# Patient Record
Sex: MALE | Race: BLACK OR AFRICAN AMERICAN | Employment: UNEMPLOYED | URBAN - METROPOLITAN AREA
[De-identification: names, ages, dates, MRNs, and addresses within clinical notes are randomized per-mention and may not be internally consistent; named-entity substitution may affect disease eponyms.]

---

## 2023-03-30 ENCOUNTER — HOSPITAL ENCOUNTER (EMERGENCY)
Age: 53
Discharge: HOME | End: 2023-03-30
Attending: EMERGENCY MEDICINE
Payer: COMMERCIAL

## 2023-03-30 ENCOUNTER — APPOINTMENT (OUTPATIENT)
Dept: GENERAL RADIOLOGY | Age: 53
End: 2023-03-30
Attending: EMERGENCY MEDICINE
Payer: COMMERCIAL

## 2023-03-30 VITALS
WEIGHT: 190 LBS | SYSTOLIC BLOOD PRESSURE: 151 MMHG | HEIGHT: 69 IN | TEMPERATURE: 98.4 F | RESPIRATION RATE: 18 BRPM | OXYGEN SATURATION: 98 % | DIASTOLIC BLOOD PRESSURE: 103 MMHG | BODY MASS INDEX: 28.14 KG/M2 | HEART RATE: 70 BPM

## 2023-03-30 DIAGNOSIS — R68.89 FLU-LIKE SYMPTOMS: Primary | ICD-10-CM

## 2023-03-30 LAB
FLUAV AG NPH QL IA: NEGATIVE
FLUBV AG NOSE QL IA: NEGATIVE
SARS-COV-2 RDRP RESP QL NAA+PROBE: NOT DETECTED

## 2023-03-30 PROCEDURE — 74011250636 HC RX REV CODE- 250/636: Performed by: EMERGENCY MEDICINE

## 2023-03-30 PROCEDURE — 87635 SARS-COV-2 COVID-19 AMP PRB: CPT

## 2023-03-30 PROCEDURE — 87804 INFLUENZA ASSAY W/OPTIC: CPT

## 2023-03-30 PROCEDURE — 71046 X-RAY EXAM CHEST 2 VIEWS: CPT

## 2023-03-30 PROCEDURE — 99284 EMERGENCY DEPT VISIT MOD MDM: CPT

## 2023-03-30 PROCEDURE — 93005 ELECTROCARDIOGRAM TRACING: CPT

## 2023-03-30 RX ORDER — KETOROLAC TROMETHAMINE 10 MG/1
10 TABLET, FILM COATED ORAL
Qty: 20 TABLET | Refills: 0 | Status: SHIPPED | OUTPATIENT
Start: 2023-03-30 | End: 2023-04-04

## 2023-03-30 RX ORDER — ONDANSETRON 4 MG/1
4 TABLET, ORALLY DISINTEGRATING ORAL
Qty: 20 TABLET | Refills: 0 | Status: SHIPPED | OUTPATIENT
Start: 2023-03-30

## 2023-03-30 RX ORDER — ONDANSETRON 4 MG/1
4 TABLET, ORALLY DISINTEGRATING ORAL
Status: COMPLETED | OUTPATIENT
Start: 2023-03-30 | End: 2023-03-30

## 2023-03-30 RX ADMIN — ONDANSETRON 4 MG: 4 TABLET, ORALLY DISINTEGRATING ORAL at 20:25

## 2023-03-30 NOTE — Clinical Note
[facility] EMERGENCY DEPT  [address] [place] [id number]  [phone]    Work/School Note    Date: 3/30/2023    To Whom It May concern:    Iva Sethi was seen and treated today in the emergency room by the following provider(s):  Attending Provider: Norman Verma MD.      Iva Sethi is excused from work/school on 3/30/2023 through 4/1/2023. He is medically clear to return to work/school on 4/2/2023.          Sincerely,          Andrew Mooney

## 2023-03-30 NOTE — Clinical Note
[facility] EMERGENCY DEPT  [address] [place] [id number]  [phone]    Work/School Note    Date: 3/30/2023    To Whom It May concern:    Mariela Farfan was seen and treated today in the emergency room by the following provider(s):  Attending Provider: Mat Haque MD.      Mariela Farfan is excused from work/school on 3/30/2023 through 4/1/2023. He is medically clear to return to work/school on 4/2/2023.          Sincerely,          Brian Collins MD

## 2023-03-31 LAB
ATRIAL RATE: 74 BPM
CALCULATED P AXIS, ECG09: 76 DEGREES
CALCULATED R AXIS, ECG10: 52 DEGREES
CALCULATED T AXIS, ECG11: 48 DEGREES
DIAGNOSIS, 93000: NORMAL
P-R INTERVAL, ECG05: 140 MS
Q-T INTERVAL, ECG07: 414 MS
QRS DURATION, ECG06: 88 MS
QTC CALCULATION (BEZET), ECG08: 459 MS
VENTRICULAR RATE, ECG03: 74 BPM

## 2023-03-31 NOTE — ED PROVIDER NOTES
EMERGENCY DEPARTMENT HISTORY AND PHYSICAL EXAM      Date: 3/30/2023  Patient Name: Felipe Burrell    History of Presenting Illness     Chief Complaint   Patient presents with    Headache    Nausea       History Provided By: Patient    HPI: Felipe Burrell, 47 y.o. male presents to the ED with CC of 3 days history of fever, cough, headache and nausea. Patient states the symptoms started feeling better but work wanted to get him evaluated. Patient denies SOB, chest pain, or any neurological symptoms. There are no other complaints, changes, or physical findings at this time. Past History     Past Medical History:  Past Medical History:   Diagnosis Date    Arrhythmia     HIV (human immunodeficiency virus infection) ([facility] [address]        Allergies: Allergies   Allergen Reactions    Keppra [Levetiracetam] Unknown (comments)       Review of Systems   Vital signs and nursing notes reviewed  Review of Systems  Negative except as per HPI. Physical Exam   Visit Vitals  BP (!) 151/103 (BP 1 Location: Right upper arm, BP Patient Position: At rest)   Pulse 70   Temp 98.4 °F (36.9 °C)   Resp 18   Ht 5' 9\" (1.753 m)   Wt 86.2 kg (190 lb)   SpO2 98%   BMI 28.06 kg/m²     CONSTITUTIONAL: Alert, in no distress. Appears stated age. HEAD:  Normocephalic, atraumatic  EYES: EOM intact. No conjunctival injection or scleral icterus  Neck:  Supple. No meningismus  RESP: Normal with no work of breathing, speaking in full sentences. CTA B.  CV: Well perfused. NEURO: Alert with normal mentation, moving extremities spontaneously  PSYCH: Normal mood, normal affect      Medical Decision Making   Patient presents for flu-like symptoms with normal oxygen saturation, benign physical exam and mild URI symptoms or exposure. Influenza testing was conducted. The patient was given supportive care recommendations and agrees with the plan to be discharged home. Tamiflu was not prescribed.   They were provided instructions to return for difficulty breathing, chest pain, altered mentation, or any other new or worsening symptoms. ED Course:   Initial assessment performed. The patients presenting problems have been discussed, and they are in agreement with the care plan formulated and outlined with them. I have encouraged them to ask questions as they arise throughout their visit. Critical Care Time: None    Disposition:  DISCHARGE NOTE:  The pt is ready for discharge. The pt's signs, symptoms, diagnosis, and discharge instructions have been discussed and pt has conveyed their understanding. The pt is to follow up as recommended or return to ER should their symptoms worsen. Plan has been discussed and pt is in agreement. PLAN:  1. Current Discharge Medication List        START taking these medications    Details   ondansetron (ZOFRAN ODT) 4 mg disintegrating tablet Take 1 Tablet by mouth every eight (8) hours as needed for Nausea or Vomiting. Qty: 20 Tablet, Refills: 0  Start date: 3/30/2023      ketorolac (TORADOL) 10 mg tablet Take 1 Tablet by mouth every six (6) hours as needed for Pain for up to 5 days. Qty: 20 Tablet, Refills: 0  Start date: 3/30/2023, End date: 4/4/2023           2. Follow-up Information       Follow up With Specialties Details Why Contact Info    Lavinia Ozuna [place] Physician Assistant  As needed [address] [place] [address] [place] [address] [phone]            3. Flu Testing results, if performed, will be called if positive. Patient should utilize [employer] to access results. 4. Take Tylenol or Ibuprofen as needed  5. Drink plenty of fluids  6. Return to ED if worse especially if any shortness of breath, chest pain or altered mentation. Diagnosis     Clinical Impression:   1. Flu-like symptoms        Please note that this dictation was completed with [employer], the [employer] voice recognition software.  Quite often unanticipated grammatical, syntax, homophones, and other interpretive errors are inadvertently transcribed by the computer software. Please disregards these errors. Please excuse any errors that have escaped final proofreading.

## 2023-03-31 NOTE — DISCHARGE INSTRUCTIONS
Thank you! Thank you for allowing me to care for you in the emergency department. It is my goal to provide you with excellent care. If you have not received excellent quality care, please ask to speak to the nurse manager. Please fill out the survey that will come to you by mail or email since we listen to your feedback! Below you will find a list of your tests from today's visit. Should you have any questions, please do not hesitate to call the emergency department. Labs  Recent Results (from the past 12 hour(s))   INFLUENZA A & B AG (RAPID TEST)    Collection Time: 03/30/23  8:27 PM   Result Value Ref Range    Influenza A Antigen Negative Negative      Influenza B Antigen Negative Negative     COVID-19 RAPID TEST    Collection Time: 03/30/23  8:27 PM   Result Value Ref Range    COVID-19 rapid test Not Detected Not Detected         Radiologic Studies  XR CHEST PA LAT   Final Result   No acute cardiopulmonary disease. CT Results  (Last 48 hours)      None          CXR Results  (Last 48 hours)                 03/30/23 2037  XR CHEST PA LAT Final result    Impression:  No acute cardiopulmonary disease. Narrative: Indication:  Cough x3 days. Exam: PA and lateral views of the chest.       Direct comparison is made to prior CXR dated November 2022. Findings: Cardiomediastinal silhouette is within normal limits. Lungs are clear   bilaterally. Pleural spaces are normal. Osseous structures are intact.                 ------------------------------------------------------------------------------------------------------------  The exam and treatment you received in the Emergency Department were for an urgent problem and are not intended as complete care. It is important that you follow-up with a doctor, nurse practitioner, or physician assistant to:  (1) confirm your diagnosis,  (2) re-evaluation of changes in your illness and treatment, and  (3) for ongoing care.  Please take your discharge instructions with you when you go to your follow-up appointment. If you have any problem arranging a follow-up appointment, contact the Emergency Department. If your symptoms become worse or you do not improve as expected and you are unable to reach your health care provider, please return to the Emergency Department. We are available 24 hours a day. If a prescription has been provided, please have it filled as soon as possible to prevent a delay in treatment. If you have any questions or reservations about taking the medication due to side effects or interactions with other medications, please call your primary care provider or contact the ER.

## 2023-05-18 ENCOUNTER — HOSPITAL ENCOUNTER (EMERGENCY)
Facility: HOSPITAL | Age: 53
Discharge: HOME | End: 2023-05-18
Attending: EMERGENCY MEDICINE
Payer: COMMERCIAL

## 2023-05-18 VITALS
WEIGHT: 190 LBS | HEART RATE: 86 BPM | OXYGEN SATURATION: 97 % | BODY MASS INDEX: 28.14 KG/M2 | TEMPERATURE: 100.7 F | DIASTOLIC BLOOD PRESSURE: 67 MMHG | RESPIRATION RATE: 15 BRPM | SYSTOLIC BLOOD PRESSURE: 128 MMHG | HEIGHT: 69 IN

## 2023-05-18 DIAGNOSIS — B34.9 ACUTE VIRAL SYNDROME: Primary | ICD-10-CM

## 2023-05-18 PROCEDURE — 99283 EMERGENCY DEPT VISIT LOW MDM: CPT

## 2023-05-18 RX ORDER — NAPROXEN 500 MG/1
500 TABLET ORAL 2 TIMES DAILY
Qty: 60 TABLET | Refills: 0 | Status: SHIPPED | OUTPATIENT
Start: 2023-05-18

## 2023-05-18 RX ORDER — FLUTICASONE PROPIONATE 220 UG/1
2 AEROSOL, METERED RESPIRATORY (INHALATION)
COMMUNITY
Start: 2023-05-04 | End: 2024-05-03

## 2023-05-18 RX ORDER — AMLODIPINE BESYLATE 5 MG/1
5 TABLET ORAL DAILY
COMMUNITY
Start: 2023-05-15

## 2023-05-18 RX ORDER — OMEPRAZOLE 20 MG/1
20 CAPSULE, DELAYED RELEASE ORAL DAILY
Qty: 30 CAPSULE | Refills: 11 | COMMUNITY
Start: 2023-03-02 | End: 2024-03-01

## 2023-05-18 RX ORDER — LISINOPRIL 10 MG/1
10 TABLET ORAL DAILY
COMMUNITY
Start: 2022-12-20

## 2023-05-18 RX ORDER — BICTEGRAVIR SODIUM, EMTRICITABINE, AND TENOFOVIR ALAFENAMIDE FUMARATE 50; 200; 25 MG/1; MG/1; MG/1
1 TABLET ORAL DAILY
COMMUNITY
Start: 2023-03-30

## 2023-05-18 ASSESSMENT — LIFESTYLE VARIABLES
HOW MANY STANDARD DRINKS CONTAINING ALCOHOL DO YOU HAVE ON A TYPICAL DAY: PATIENT DOES NOT DRINK
HOW OFTEN DO YOU HAVE A DRINK CONTAINING ALCOHOL: NEVER

## 2023-05-18 ASSESSMENT — PAIN DESCRIPTION - LOCATION: LOCATION: HEAD

## 2023-05-18 ASSESSMENT — PAIN - FUNCTIONAL ASSESSMENT: PAIN_FUNCTIONAL_ASSESSMENT: 0-10

## 2023-05-18 ASSESSMENT — PAIN SCALES - GENERAL: PAINLEVEL_OUTOF10: 6

## 2023-05-18 NOTE — ED PROVIDER NOTES
EMERGENCY DEPARTMENT HISTORY AND PHYSICAL EXAM      Date: 5/18/2023  Patient Name: Amy Meier    History of Presenting Illness     Chief Complaint   Patient presents with    Chills       History Provided By: Patient    HPI: Amy Meier, 47 y.o. male presents to the ED with CC of with a past medical history significant for HIV controlled with Kin Yarile presents with fever chills and a swollen lymph node in his groin. He also describes body aches and fever. He is also complaining of mild headache. He specifically denies any chest pain, shortness of breath, rash, diarrhea, headache or night sweats. Symptoms are mild at this point time but progressive getting worse as the day goes on. .       Patient denies SOB, chest pain, or any neurological symptoms. There are no other complaints, changes, or physical findings at this time. Past History     Past Medical History:  Past Medical History:   Diagnosis Date    Arrhythmia     HIV (human immunodeficiency virus infection) ([facility] [address]        Allergies: Allergies   Allergen Reactions    Levetiracetam      Other reaction(s): Unknown (comments)       Review of Systems   Vital signs and nursing notes reviewed  Review of Systems   Constitutional:  Positive for chills and fever. Neurological:  Positive for headaches. All other systems reviewed and are negative. Physical Exam     Vitals:    05/18/23 1822   BP: 128/67   Pulse: 86   Resp: 15   Temp: (!) 100.7 °F (38.2 °C)   SpO2: 97%     CONSTITUTIONAL: Alert, in no distress. Appears stated age. HEAD:  Normocephalic, atraumatic  EYES: EOM intact. No conjunctival injection or scleral icterus  Neck:  Supple. No meningismus  RESP: Normal with no work of breathing, speaking in full sentences. CV: Well perfused.    NEURO: Alert with normal mentation, moving extremities spontaneously  PSYCH: Normal mood, normal affect  Abdomen: Soft nontender nondistended    Medical Decision Making   Patient presents for COVID 19 testing with

## 2023-05-19 ENCOUNTER — APPOINTMENT (OUTPATIENT)
Facility: HOSPITAL | Age: 53
End: 2023-05-19
Payer: COMMERCIAL

## 2023-05-19 ENCOUNTER — HOSPITAL ENCOUNTER (EMERGENCY)
Facility: HOSPITAL | Age: 53
Discharge: HOME | End: 2023-05-19
Payer: COMMERCIAL

## 2023-05-19 VITALS
BODY MASS INDEX: 28.14 KG/M2 | SYSTOLIC BLOOD PRESSURE: 150 MMHG | HEART RATE: 51 BPM | WEIGHT: 190 LBS | TEMPERATURE: 99.4 F | HEIGHT: 69 IN | DIASTOLIC BLOOD PRESSURE: 78 MMHG | RESPIRATION RATE: 20 BRPM | OXYGEN SATURATION: 99 %

## 2023-05-19 DIAGNOSIS — L03.115 CELLULITIS OF RIGHT LOWER EXTREMITY: Primary | ICD-10-CM

## 2023-05-19 DIAGNOSIS — R59.9 REACTIVE LYMPHADENOPATHY: ICD-10-CM

## 2023-05-19 LAB
DEPRECATED S PYO AG THROAT QL EIA: NEGATIVE
FLUAV AG NPH QL IA: NEGATIVE
FLUBV AG NOSE QL IA: NEGATIVE
SARS-COV-2 RDRP RESP QL NAA+PROBE: NOT DETECTED

## 2023-05-19 PROCEDURE — 6370000000 HC RX 637 (ALT 250 FOR IP): Performed by: STUDENT IN AN ORGANIZED HEALTH CARE EDUCATION/TRAINING PROGRAM

## 2023-05-19 PROCEDURE — 87070 CULTURE OTHR SPECIMN AEROBIC: CPT

## 2023-05-19 PROCEDURE — 71046 X-RAY EXAM CHEST 2 VIEWS: CPT

## 2023-05-19 PROCEDURE — 99284 EMERGENCY DEPT VISIT MOD MDM: CPT

## 2023-05-19 PROCEDURE — 87635 SARS-COV-2 COVID-19 AMP PRB: CPT

## 2023-05-19 PROCEDURE — 87804 INFLUENZA ASSAY W/OPTIC: CPT

## 2023-05-19 PROCEDURE — 87880 STREP A ASSAY W/OPTIC: CPT

## 2023-05-19 RX ORDER — IBUPROFEN 400 MG/1
400 TABLET ORAL EVERY 6 HOURS PRN
Qty: 20 TABLET | Refills: 0 | Status: SHIPPED | OUTPATIENT
Start: 2023-05-19 | End: 2023-05-19 | Stop reason: SDUPTHER

## 2023-05-19 RX ORDER — ACETAMINOPHEN 500 MG
1000 TABLET ORAL
Status: COMPLETED | OUTPATIENT
Start: 2023-05-19 | End: 2023-05-19

## 2023-05-19 RX ORDER — SULFAMETHOXAZOLE AND TRIMETHOPRIM 800; 160 MG/1; MG/1
1 TABLET ORAL 2 TIMES DAILY
Qty: 20 TABLET | Refills: 0 | Status: SHIPPED | OUTPATIENT
Start: 2023-05-19 | End: 2023-05-29

## 2023-05-19 RX ORDER — ACETAMINOPHEN 500 MG
500 TABLET ORAL 4 TIMES DAILY PRN
Qty: 100 TABLET | Refills: 0 | Status: SHIPPED | OUTPATIENT
Start: 2023-05-19

## 2023-05-19 RX ORDER — IBUPROFEN 400 MG/1
400 TABLET ORAL EVERY 6 HOURS PRN
Qty: 20 TABLET | Refills: 0 | Status: SHIPPED | OUTPATIENT
Start: 2023-05-19

## 2023-05-19 RX ORDER — CEPHALEXIN 500 MG/1
500 CAPSULE ORAL 4 TIMES DAILY
Qty: 40 CAPSULE | Refills: 0 | Status: SHIPPED | OUTPATIENT
Start: 2023-05-19 | End: 2023-05-19 | Stop reason: SDUPTHER

## 2023-05-19 RX ORDER — TRAMADOL HYDROCHLORIDE 50 MG/1
50 TABLET ORAL EVERY 6 HOURS PRN
Qty: 12 TABLET | Refills: 0 | Status: SHIPPED | OUTPATIENT
Start: 2023-05-19 | End: 2023-05-19 | Stop reason: SDUPTHER

## 2023-05-19 RX ORDER — SULFAMETHOXAZOLE AND TRIMETHOPRIM 800; 160 MG/1; MG/1
1 TABLET ORAL 2 TIMES DAILY
Qty: 20 TABLET | Refills: 0 | Status: SHIPPED | OUTPATIENT
Start: 2023-05-19 | End: 2023-05-19 | Stop reason: SDUPTHER

## 2023-05-19 RX ORDER — IBUPROFEN 600 MG/1
600 TABLET ORAL
Status: COMPLETED | OUTPATIENT
Start: 2023-05-19 | End: 2023-05-19

## 2023-05-19 RX ORDER — ACETAMINOPHEN 500 MG
500 TABLET ORAL 4 TIMES DAILY PRN
Qty: 100 TABLET | Refills: 0 | Status: SHIPPED | OUTPATIENT
Start: 2023-05-19 | End: 2023-05-19 | Stop reason: SDUPTHER

## 2023-05-19 RX ORDER — CEPHALEXIN 500 MG/1
500 CAPSULE ORAL 4 TIMES DAILY
Qty: 40 CAPSULE | Refills: 0 | Status: SHIPPED | OUTPATIENT
Start: 2023-05-19 | End: 2023-05-29

## 2023-05-19 RX ORDER — TRAMADOL HYDROCHLORIDE 50 MG/1
50 TABLET ORAL EVERY 6 HOURS PRN
Qty: 12 TABLET | Refills: 0 | Status: SHIPPED | OUTPATIENT
Start: 2023-05-19 | End: 2023-05-22

## 2023-05-19 RX ADMIN — ACETAMINOPHEN 1000 MG: 500 TABLET ORAL at 13:40

## 2023-05-19 RX ADMIN — IBUPROFEN 600 MG: 600 TABLET, FILM COATED ORAL at 13:40

## 2023-05-19 ASSESSMENT — PAIN SCALES - GENERAL: PAINLEVEL_OUTOF10: 10

## 2023-05-19 ASSESSMENT — PAIN SCALES - WONG BAKER: WONGBAKER_NUMERICALRESPONSE: 0

## 2023-05-19 NOTE — ED TRIAGE NOTES
Boils under arms, body aches, chills, states \"I cant breathe\" fever here 102.4, hx of hiv, compliant w/ meds \"undetectable\"

## 2023-05-19 NOTE — ED PROVIDER NOTES
MEDICATIONS:  Current Discharge Medication List          I am the Primary Clinician of Record: ÁNGEL Johnson NP (electronically signed)    (Please note that parts of this dictation were completed with voice recognition software. Quite often unanticipated grammatical, syntax, homophones, and other interpretive errors are inadvertently transcribed by the computer software. Please disregards these errors.  Please excuse any errors that have escaped final proofreading.)

## 2023-05-19 NOTE — ED NOTES
Pt understanding of dc instructions medications and followup care, alert oriented and ambulatory at discharge.       Lina Newman RN  05/19/23 2643

## 2023-05-21 LAB
BACTERIA SPEC CULT: NORMAL
Lab: NORMAL

## 2023-06-29 ENCOUNTER — HOSPITAL ENCOUNTER (INPATIENT)
Facility: HOSPITAL | Age: 53
LOS: 6 days | Discharge: HOME | DRG: 751 | End: 2023-07-06
Attending: EMERGENCY MEDICINE | Admitting: PSYCHIATRY & NEUROLOGY
Payer: COMMERCIAL

## 2023-06-29 ENCOUNTER — APPOINTMENT (OUTPATIENT)
Facility: HOSPITAL | Age: 53
DRG: 751 | End: 2023-06-29
Payer: COMMERCIAL

## 2023-06-29 DIAGNOSIS — R07.89 ATYPICAL CHEST PAIN: Primary | ICD-10-CM

## 2023-06-29 DIAGNOSIS — F32.A DEPRESSION, UNSPECIFIED DEPRESSION TYPE: ICD-10-CM

## 2023-06-29 LAB
ALBUMIN SERPL-MCNC: 3.4 G/DL (ref 3.5–5)
ALBUMIN/GLOB SERPL: 1.2 (ref 1.1–2.2)
ALP SERPL-CCNC: 68 U/L (ref 45–117)
ALT SERPL-CCNC: 25 U/L (ref 12–78)
AMPHET UR QL SCN: NEGATIVE
ANION GAP SERPL CALC-SCNC: 6 MMOL/L (ref 5–15)
APTT PPP: 24.6 SEC (ref 21.2–34.1)
AST SERPL W P-5'-P-CCNC: 17 U/L (ref 15–37)
BARBITURATES UR QL SCN: NEGATIVE
BASOPHILS # BLD: 0 K/UL (ref 0–0.1)
BASOPHILS NFR BLD: 1 % (ref 0–1)
BENZODIAZ UR QL: NEGATIVE
BILIRUB SERPL-MCNC: 0.6 MG/DL (ref 0.2–1)
BNP SERPL-MCNC: 220 PG/ML
BUN SERPL-MCNC: 17 MG/DL (ref 6–20)
BUN/CREAT SERPL: 13 (ref 12–20)
CA-I BLD-MCNC: 8.8 MG/DL (ref 8.5–10.1)
CANNABINOIDS UR QL SCN: NEGATIVE
CHLORIDE SERPL-SCNC: 109 MMOL/L (ref 97–108)
CO2 SERPL-SCNC: 26 MMOL/L (ref 21–32)
COCAINE UR QL SCN: POSITIVE
CREAT SERPL-MCNC: 1.32 MG/DL (ref 0.7–1.3)
DIFFERENTIAL METHOD BLD: ABNORMAL
EOSINOPHIL # BLD: 0 K/UL (ref 0–0.4)
EOSINOPHIL NFR BLD: 1 % (ref 0–7)
ERYTHROCYTE [DISTWIDTH] IN BLOOD BY AUTOMATED COUNT: 12.8 % (ref 11.5–14.5)
ETHANOL SERPL-MCNC: <10 MG/DL (ref 0–0.08)
GLOBULIN SER CALC-MCNC: 2.8 G/DL (ref 2–4)
GLUCOSE SERPL-MCNC: 180 MG/DL (ref 65–100)
HCT VFR BLD AUTO: 38.1 % (ref 36.6–50.3)
HGB BLD-MCNC: 12.5 G/DL (ref 12.1–17)
IMM GRANULOCYTES # BLD AUTO: 0 K/UL (ref 0–0.04)
IMM GRANULOCYTES NFR BLD AUTO: 0 % (ref 0–0.5)
INR PPP: 1.1 (ref 0.9–1.1)
LIPASE SERPL-CCNC: 95 U/L (ref 73–393)
LYMPHOCYTES # BLD: 1.9 K/UL (ref 0.8–3.5)
LYMPHOCYTES NFR BLD: 53 % (ref 12–49)
Lab: ABNORMAL
MAGNESIUM SERPL-MCNC: 1.9 MG/DL (ref 1.6–2.4)
MCH RBC QN AUTO: 30 PG (ref 26–34)
MCHC RBC AUTO-ENTMCNC: 32.8 G/DL (ref 30–36.5)
MCV RBC AUTO: 91.4 FL (ref 80–99)
METHADONE UR QL: NEGATIVE
MONOCYTES # BLD: 0.3 K/UL (ref 0–1)
MONOCYTES NFR BLD: 9 % (ref 5–13)
NEUTS SEG # BLD: 1.3 K/UL (ref 1.8–8)
NEUTS SEG NFR BLD: 36 % (ref 32–75)
NRBC # BLD: 0 K/UL (ref 0–0.01)
NRBC BLD-RTO: 0 PER 100 WBC
OPIATES UR QL: NEGATIVE
PCP UR QL: NEGATIVE
PLATELET # BLD AUTO: 172 K/UL (ref 150–400)
PMV BLD AUTO: 10.5 FL (ref 8.9–12.9)
POTASSIUM SERPL-SCNC: 4.1 MMOL/L (ref 3.5–5.1)
PROT SERPL-MCNC: 6.2 G/DL (ref 6.4–8.2)
PROTHROMBIN TIME: 14.7 SEC (ref 11.9–14.6)
RBC # BLD AUTO: 4.17 M/UL (ref 4.1–5.7)
SODIUM SERPL-SCNC: 141 MMOL/L (ref 136–145)
THERAPEUTIC RANGE: NORMAL SEC
TROPONIN I SERPL HS-MCNC: 11 NG/L (ref 0–76)
TROPONIN I SERPL HS-MCNC: 12 NG/L (ref 0–76)
WBC # BLD AUTO: 3.6 K/UL (ref 4.1–11.1)

## 2023-06-29 PROCEDURE — 83880 ASSAY OF NATRIURETIC PEPTIDE: CPT

## 2023-06-29 PROCEDURE — 71045 X-RAY EXAM CHEST 1 VIEW: CPT

## 2023-06-29 PROCEDURE — 6370000000 HC RX 637 (ALT 250 FOR IP): Performed by: EMERGENCY MEDICINE

## 2023-06-29 PROCEDURE — 83690 ASSAY OF LIPASE: CPT

## 2023-06-29 PROCEDURE — 84484 ASSAY OF TROPONIN QUANT: CPT

## 2023-06-29 PROCEDURE — 81001 URINALYSIS AUTO W/SCOPE: CPT

## 2023-06-29 PROCEDURE — 83735 ASSAY OF MAGNESIUM: CPT

## 2023-06-29 PROCEDURE — 80307 DRUG TEST PRSMV CHEM ANLYZR: CPT

## 2023-06-29 PROCEDURE — 36415 COLL VENOUS BLD VENIPUNCTURE: CPT

## 2023-06-29 PROCEDURE — 85730 THROMBOPLASTIN TIME PARTIAL: CPT

## 2023-06-29 PROCEDURE — 87641 MR-STAPH DNA AMP PROBE: CPT

## 2023-06-29 PROCEDURE — 85610 PROTHROMBIN TIME: CPT

## 2023-06-29 PROCEDURE — 87636 SARSCOV2 & INF A&B AMP PRB: CPT

## 2023-06-29 PROCEDURE — 80053 COMPREHEN METABOLIC PANEL: CPT

## 2023-06-29 PROCEDURE — 99285 EMERGENCY DEPT VISIT HI MDM: CPT

## 2023-06-29 PROCEDURE — 85025 COMPLETE CBC W/AUTO DIFF WBC: CPT

## 2023-06-29 PROCEDURE — 82077 ASSAY SPEC XCP UR&BREATH IA: CPT

## 2023-06-29 PROCEDURE — 93005 ELECTROCARDIOGRAM TRACING: CPT | Performed by: EMERGENCY MEDICINE

## 2023-06-29 RX ORDER — ASPIRIN 81 MG/1
324 TABLET, CHEWABLE ORAL
Status: COMPLETED | OUTPATIENT
Start: 2023-06-29 | End: 2023-06-29

## 2023-06-29 RX ADMIN — ASPIRIN 324 MG: 81 TABLET, CHEWABLE ORAL at 15:19

## 2023-06-29 ASSESSMENT — SLEEP AND FATIGUE QUESTIONNAIRES
DO YOU USE A SLEEP AID: NO
SLEEP PATTERN: INSOMNIA
AVERAGE NUMBER OF SLEEP HOURS: 2
DO YOU HAVE DIFFICULTY SLEEPING: YES

## 2023-06-29 ASSESSMENT — LIFESTYLE VARIABLES
HOW OFTEN DO YOU HAVE A DRINK CONTAINING ALCOHOL: NEVER
HOW MANY STANDARD DRINKS CONTAINING ALCOHOL DO YOU HAVE ON A TYPICAL DAY: PATIENT DOES NOT DRINK

## 2023-06-30 PROBLEM — F32.A DEPRESSION: Status: ACTIVE | Noted: 2023-06-30

## 2023-06-30 PROBLEM — F39 UNSPECIFIED MOOD (AFFECTIVE) DISORDER (HCC): Status: ACTIVE | Noted: 2023-06-30

## 2023-06-30 LAB
APPEARANCE UR: CLEAR
BACTERIA URNS QL MICRO: NEGATIVE /HPF
BILIRUB UR QL: NEGATIVE
COLOR UR: NORMAL
EKG ATRIAL RATE: 86 BPM
EKG DIAGNOSIS: NORMAL
EKG P AXIS: 75 DEGREES
EKG P-R INTERVAL: 126 MS
EKG Q-T INTERVAL: 370 MS
EKG QRS DURATION: 86 MS
EKG QTC CALCULATION (BAZETT): 442 MS
EKG R AXIS: 68 DEGREES
EKG T AXIS: 71 DEGREES
EKG VENTRICULAR RATE: 86 BPM
EPITH CASTS URNS QL MICRO: NORMAL /LPF
FLUAV RNA SPEC QL NAA+PROBE: NOT DETECTED
FLUBV RNA SPEC QL NAA+PROBE: NOT DETECTED
GLUCOSE UR STRIP.AUTO-MCNC: NEGATIVE MG/DL
HGB UR QL STRIP: NEGATIVE
KETONES UR QL STRIP.AUTO: NEGATIVE MG/DL
LEUKOCYTE ESTERASE UR QL STRIP.AUTO: NEGATIVE
MRSA DNA SPEC QL NAA+PROBE: DETECTED
MUCOUS THREADS URNS QL MICRO: NORMAL /LPF
NITRITE UR QL STRIP.AUTO: NEGATIVE
PH UR STRIP: 5 (ref 5–8)
PROT UR STRIP-MCNC: NEGATIVE MG/DL
RBC #/AREA URNS HPF: NORMAL /HPF (ref 0–5)
SARS-COV-2 RNA RESP QL NAA+PROBE: NOT DETECTED
SP GR UR REFRACTOMETRY: 1.03 (ref 1–1.03)
URINE CULTURE IF INDICATED: NORMAL
UROBILINOGEN UR QL STRIP.AUTO: 0.1 EU/DL (ref 0.1–1)
WBC URNS QL MICRO: NORMAL /HPF (ref 0–4)

## 2023-06-30 PROCEDURE — 6370000000 HC RX 637 (ALT 250 FOR IP): Performed by: PSYCHIATRY & NEUROLOGY

## 2023-06-30 PROCEDURE — 1240000000 HC EMOTIONAL WELLNESS R&B

## 2023-06-30 RX ORDER — AMLODIPINE BESYLATE 5 MG/1
5 TABLET ORAL DAILY
Status: DISCONTINUED | OUTPATIENT
Start: 2023-06-30 | End: 2023-07-06 | Stop reason: HOSPADM

## 2023-06-30 RX ORDER — POLYETHYLENE GLYCOL 3350 17 G/17G
17 POWDER, FOR SOLUTION ORAL DAILY PRN
Status: DISCONTINUED | OUTPATIENT
Start: 2023-06-30 | End: 2023-07-06 | Stop reason: HOSPADM

## 2023-06-30 RX ORDER — ACETAMINOPHEN 325 MG/1
650 TABLET ORAL EVERY 4 HOURS PRN
Status: DISCONTINUED | OUTPATIENT
Start: 2023-06-30 | End: 2023-07-06 | Stop reason: HOSPADM

## 2023-06-30 RX ORDER — NICOTINE 21 MG/24HR
1 PATCH, TRANSDERMAL 24 HOURS TRANSDERMAL DAILY
Status: DISCONTINUED | OUTPATIENT
Start: 2023-06-30 | End: 2023-07-06 | Stop reason: HOSPADM

## 2023-06-30 RX ORDER — LISINOPRIL 10 MG/1
10 TABLET ORAL DAILY
Status: DISCONTINUED | OUTPATIENT
Start: 2023-06-30 | End: 2023-07-06 | Stop reason: HOSPADM

## 2023-06-30 RX ORDER — FLUTICASONE PROPIONATE 220 UG/1
2 AEROSOL, METERED RESPIRATORY (INHALATION) 2 TIMES DAILY PRN
Status: DISCONTINUED | OUTPATIENT
Start: 2023-06-30 | End: 2023-07-06 | Stop reason: HOSPADM

## 2023-06-30 RX ORDER — NAPROXEN 500 MG/1
500 TABLET ORAL 2 TIMES DAILY
Status: DISCONTINUED | OUTPATIENT
Start: 2023-06-30 | End: 2023-07-03

## 2023-06-30 RX ORDER — HYDROXYZINE 50 MG/1
50 TABLET, FILM COATED ORAL 3 TIMES DAILY PRN
Status: DISCONTINUED | OUTPATIENT
Start: 2023-06-30 | End: 2023-07-06 | Stop reason: HOSPADM

## 2023-06-30 RX ORDER — TRAZODONE HYDROCHLORIDE 50 MG/1
50 TABLET ORAL NIGHTLY PRN
Status: DISCONTINUED | OUTPATIENT
Start: 2023-06-30 | End: 2023-07-06 | Stop reason: HOSPADM

## 2023-06-30 RX ORDER — ESCITALOPRAM OXALATE 10 MG/1
5 TABLET ORAL DAILY
Status: DISCONTINUED | OUTPATIENT
Start: 2023-07-01 | End: 2023-07-06 | Stop reason: HOSPADM

## 2023-06-30 RX ORDER — ALBUTEROL SULFATE 90 UG/1
2 AEROSOL, METERED RESPIRATORY (INHALATION) EVERY 6 HOURS PRN
Status: DISCONTINUED | OUTPATIENT
Start: 2023-06-30 | End: 2023-07-06 | Stop reason: HOSPADM

## 2023-06-30 RX ORDER — CETIRIZINE HYDROCHLORIDE 10 MG/1
10 TABLET ORAL DAILY PRN
Status: DISCONTINUED | OUTPATIENT
Start: 2023-06-30 | End: 2023-07-06 | Stop reason: HOSPADM

## 2023-06-30 RX ORDER — MAGNESIUM HYDROXIDE/ALUMINUM HYDROXICE/SIMETHICONE 120; 1200; 1200 MG/30ML; MG/30ML; MG/30ML
30 SUSPENSION ORAL EVERY 6 HOURS PRN
Status: DISCONTINUED | OUTPATIENT
Start: 2023-06-30 | End: 2023-07-06 | Stop reason: HOSPADM

## 2023-06-30 RX ADMIN — METOPROLOL TARTRATE 25 MG: 25 TABLET, FILM COATED ORAL at 08:49

## 2023-06-30 RX ADMIN — NAPROXEN 500 MG: 500 TABLET ORAL at 08:49

## 2023-06-30 RX ADMIN — TRAZODONE HYDROCHLORIDE 50 MG: 50 TABLET ORAL at 21:54

## 2023-06-30 RX ADMIN — HYDROXYZINE HYDROCHLORIDE 50 MG: 50 TABLET, FILM COATED ORAL at 21:53

## 2023-06-30 RX ADMIN — AMLODIPINE BESYLATE 5 MG: 5 TABLET ORAL at 08:49

## 2023-06-30 RX ADMIN — NAPROXEN 500 MG: 500 TABLET ORAL at 21:53

## 2023-06-30 RX ADMIN — LISINOPRIL 10 MG: 10 TABLET ORAL at 08:49

## 2023-06-30 RX ADMIN — BICTEGRAVIR SODIUM, EMTRICITABINE, AND TENOFOVIR ALAFENAMIDE FUMARATE 1 TABLET: 50; 200; 25 TABLET ORAL at 09:24

## 2023-06-30 ASSESSMENT — PAIN SCALES - GENERAL
PAINLEVEL_OUTOF10: 0
PAINLEVEL_OUTOF10: 4
PAINLEVEL_OUTOF10: 0

## 2023-06-30 ASSESSMENT — PAIN DESCRIPTION - LOCATION: LOCATION: GENERALIZED

## 2023-06-30 ASSESSMENT — PAIN DESCRIPTION - DESCRIPTORS: DESCRIPTORS: ACHING

## 2023-06-30 ASSESSMENT — ENCOUNTER SYMPTOMS
SINUS PRESSURE: 1
RESPIRATORY NEGATIVE: 1
GASTROINTESTINAL NEGATIVE: 1

## 2023-07-01 PROCEDURE — 6370000000 HC RX 637 (ALT 250 FOR IP): Performed by: PSYCHIATRY & NEUROLOGY

## 2023-07-01 PROCEDURE — 1100000000 HC RM PRIVATE

## 2023-07-01 PROCEDURE — 6370000000 HC RX 637 (ALT 250 FOR IP): Performed by: PHYSICIAN ASSISTANT

## 2023-07-01 RX ADMIN — ESCITALOPRAM OXALATE 5 MG: 10 TABLET ORAL at 08:36

## 2023-07-01 RX ADMIN — NAPROXEN 500 MG: 500 TABLET ORAL at 22:27

## 2023-07-01 RX ADMIN — TRAZODONE HYDROCHLORIDE 50 MG: 50 TABLET ORAL at 22:27

## 2023-07-01 RX ADMIN — NAPROXEN 500 MG: 500 TABLET ORAL at 08:36

## 2023-07-01 RX ADMIN — BICTEGRAVIR SODIUM, EMTRICITABINE, AND TENOFOVIR ALAFENAMIDE FUMARATE 1 TABLET: 50; 200; 25 TABLET ORAL at 08:36

## 2023-07-01 RX ADMIN — MUPIROCIN: 20 OINTMENT TOPICAL at 22:27

## 2023-07-01 RX ADMIN — MUPIROCIN: 20 OINTMENT TOPICAL at 13:21

## 2023-07-01 ASSESSMENT — PAIN SCALES - GENERAL: PAINLEVEL_OUTOF10: 6

## 2023-07-01 ASSESSMENT — PAIN DESCRIPTION - LOCATION: LOCATION: ARM;BACK;SHOULDER

## 2023-07-01 ASSESSMENT — PAIN DESCRIPTION - PAIN TYPE: TYPE: CHRONIC PAIN

## 2023-07-02 PROCEDURE — 6370000000 HC RX 637 (ALT 250 FOR IP): Performed by: PSYCHIATRY & NEUROLOGY

## 2023-07-02 PROCEDURE — 1100000000 HC RM PRIVATE

## 2023-07-02 RX ORDER — ATENOLOL 50 MG/1
25 TABLET ORAL DAILY
Status: DISCONTINUED | OUTPATIENT
Start: 2023-07-03 | End: 2023-07-03

## 2023-07-02 RX ORDER — ATENOLOL 50 MG/1
25 TABLET ORAL DAILY
Status: DISCONTINUED | OUTPATIENT
Start: 2023-07-02 | End: 2023-07-02

## 2023-07-02 RX ADMIN — NAPROXEN 500 MG: 500 TABLET ORAL at 08:39

## 2023-07-02 RX ADMIN — NAPROXEN 500 MG: 500 TABLET ORAL at 21:27

## 2023-07-02 RX ADMIN — MUPIROCIN: 20 OINTMENT TOPICAL at 08:38

## 2023-07-02 RX ADMIN — BICTEGRAVIR SODIUM, EMTRICITABINE, AND TENOFOVIR ALAFENAMIDE FUMARATE 1 TABLET: 50; 200; 25 TABLET ORAL at 08:38

## 2023-07-02 RX ADMIN — MUPIROCIN: 20 OINTMENT TOPICAL at 21:26

## 2023-07-02 RX ADMIN — TRAZODONE HYDROCHLORIDE 50 MG: 50 TABLET ORAL at 21:27

## 2023-07-02 RX ADMIN — ESCITALOPRAM OXALATE 5 MG: 10 TABLET ORAL at 08:38

## 2023-07-02 ASSESSMENT — PAIN SCALES - GENERAL: PAINLEVEL_OUTOF10: 0

## 2023-07-03 PROCEDURE — 6370000000 HC RX 637 (ALT 250 FOR IP): Performed by: PSYCHIATRY & NEUROLOGY

## 2023-07-03 PROCEDURE — 1100000000 HC RM PRIVATE

## 2023-07-03 PROCEDURE — 93005 ELECTROCARDIOGRAM TRACING: CPT | Performed by: INTERNAL MEDICINE

## 2023-07-03 RX ORDER — PANTOPRAZOLE SODIUM 20 MG/1
20 TABLET, DELAYED RELEASE ORAL DAILY
Status: DISCONTINUED | OUTPATIENT
Start: 2023-07-03 | End: 2023-07-05

## 2023-07-03 RX ADMIN — PANTOPRAZOLE SODIUM 20 MG: 20 TABLET, DELAYED RELEASE ORAL at 06:44

## 2023-07-03 RX ADMIN — NAPROXEN 500 MG: 500 TABLET ORAL at 21:40

## 2023-07-03 RX ADMIN — LISINOPRIL 10 MG: 10 TABLET ORAL at 09:03

## 2023-07-03 RX ADMIN — NAPROXEN 500 MG: 500 TABLET ORAL at 08:55

## 2023-07-03 RX ADMIN — AMLODIPINE BESYLATE 5 MG: 5 TABLET ORAL at 08:55

## 2023-07-03 RX ADMIN — ESCITALOPRAM OXALATE 5 MG: 10 TABLET ORAL at 08:55

## 2023-07-03 RX ADMIN — TRAZODONE HYDROCHLORIDE 50 MG: 50 TABLET ORAL at 21:40

## 2023-07-03 RX ADMIN — MUPIROCIN: 20 OINTMENT TOPICAL at 08:54

## 2023-07-03 RX ADMIN — BICTEGRAVIR SODIUM, EMTRICITABINE, AND TENOFOVIR ALAFENAMIDE FUMARATE 1 TABLET: 50; 200; 25 TABLET ORAL at 08:54

## 2023-07-03 RX ADMIN — MUPIROCIN: 20 OINTMENT TOPICAL at 22:03

## 2023-07-03 NOTE — BH NOTE
Patient remains on close observation. Patient has been alert, oriented, cooperative and pleasant. Patient has been up on the unit watching TV. Patient said his mood is \"alright. \"  He then admitted to having several moods at once:  depression, anxiety, frustration, sadness. He denied SI or HI. He did say he had some fatigue and SOB when talking to staff and standing up. He attributes that to his chronic heart issues, especially his heart rate. Medication compliant. Continue to assess.

## 2023-07-03 NOTE — GROUP NOTE
Group Therapy Note    Date: 7/3/2023    Group Start Time: 0935  Group End Time: 1020  Group Topic: Education Group - Inpatient    SSR 2 BH NON ACUTE    Canelo Julien        Group Therapy Note    Facilitated group to introduce the definition of self-esteem and discuss information relating to creating steps to greater self-appreciation and recognizing symptoms of self-defeat    Attendees: 3/6      Notes:  Encouraged but did not attend    Discipline Responsible: Recreational Therapist      Signature:  Orquidea Hyatt

## 2023-07-03 NOTE — GROUP NOTE
Group Therapy Note    Date: 7/3/2023    Group Start Time: 1330  Group End Time: 1400  Group Topic: Recreational    SSR 2 BH NON ACUTE    Paul Pap        Group Therapy Note    Facilitated leisure skills group to reinforce positive coping and to manage mood through music, social interaction, group activities and art task     Attendees: 2/5       Patient's Goal:  Client will learn and demonstrate effective coping skills    Notes:  Receptive to listening to music while working on leisure task. Interacted with staff    Status After Intervention:  Improved    Participation Level:  Active Listener and Interactive    Participation Quality: Appropriate and Attentive      Speech:  normal      Thought Process/Content: Logical      Affective Functioning: Congruent      Mood:  Calm      Level of consciousness:  Attentive      Response to Learning: Progressing to goal      Endings: None Reported    Modes of Intervention: Socialization and Activity      Discipline Responsible: Recreational Therapist      Signature:  Orquidea Bolton

## 2023-07-03 NOTE — BH NOTE
Behavioral Health Treatment Team Note     Patient goal(s) for today: \"I need assistance with housing and having outpt resources\"  Treatment team focus/goals: group therapy, meds management, dc planning    Progress note: pt presented to be alert/oriented upon approach, engaging, calm, cooperative, flat affect. Pt denied current si/hi/ah/vh but cont to meet criteria for inpt stay to cont stabilization on meds    LOS:  3  Expected LOS: 5-7    Insurance info/prescription coverage:  batres  Date of last family contact:  n/a.  Pt did not identify any support  Family requesting physician contact today:  No  Discharge plan:  follow up with outpt provider that will be set in place  Guns in the home:  No   Outpatient provider(s):  to be established    Participating treatment team members: Maci Borrego, * (assigned SW), Noe Hernandez MS

## 2023-07-03 NOTE — BH NOTE
Patient pleasant upon approach, affect brightens appropriately, patient endorsed anxiety and depression. Patient denied SI, HI, AH, and VH. Patient was medication compliant, did not attend group this morning. Patient remains on close observation, Q 15 minute checks.

## 2023-07-04 ENCOUNTER — APPOINTMENT (OUTPATIENT)
Facility: HOSPITAL | Age: 53
DRG: 751 | End: 2023-07-04
Attending: HOSPITALIST
Payer: COMMERCIAL

## 2023-07-04 LAB
ALBUMIN SERPL-MCNC: 3 G/DL (ref 3.5–5)
ANION GAP SERPL CALC-SCNC: 3 MMOL/L (ref 5–15)
BUN SERPL-MCNC: 24 MG/DL (ref 6–20)
BUN/CREAT SERPL: 19 (ref 12–20)
CA-I BLD-MCNC: 8.4 MG/DL (ref 8.5–10.1)
CHLORIDE SERPL-SCNC: 114 MMOL/L (ref 97–108)
CO2 SERPL-SCNC: 25 MMOL/L (ref 21–32)
CREAT SERPL-MCNC: 1.26 MG/DL (ref 0.7–1.3)
EKG ATRIAL RATE: 62 BPM
EKG DIAGNOSIS: NORMAL
EKG P AXIS: 71 DEGREES
EKG P-R INTERVAL: 148 MS
EKG Q-T INTERVAL: 376 MS
EKG QRS DURATION: 94 MS
EKG QTC CALCULATION (BAZETT): 419 MS
EKG R AXIS: 61 DEGREES
EKG T AXIS: 62 DEGREES
EKG VENTRICULAR RATE: 75 BPM
GLUCOSE SERPL-MCNC: 110 MG/DL (ref 65–100)
MAGNESIUM SERPL-MCNC: 1.8 MG/DL (ref 1.6–2.4)
PHOSPHATE SERPL-MCNC: 3.2 MG/DL (ref 2.6–4.7)
POTASSIUM SERPL-SCNC: 4.1 MMOL/L (ref 3.5–5.1)
SODIUM SERPL-SCNC: 142 MMOL/L (ref 136–145)
TSH SERPL DL<=0.05 MIU/L-ACNC: 2.75 UIU/ML (ref 0.36–3.74)

## 2023-07-04 PROCEDURE — 6370000000 HC RX 637 (ALT 250 FOR IP): Performed by: PSYCHIATRY & NEUROLOGY

## 2023-07-04 PROCEDURE — 80069 RENAL FUNCTION PANEL: CPT

## 2023-07-04 PROCEDURE — 84443 ASSAY THYROID STIM HORMONE: CPT

## 2023-07-04 PROCEDURE — 1100000000 HC RM PRIVATE

## 2023-07-04 PROCEDURE — 6370000000 HC RX 637 (ALT 250 FOR IP): Performed by: HOSPITALIST

## 2023-07-04 PROCEDURE — 36415 COLL VENOUS BLD VENIPUNCTURE: CPT

## 2023-07-04 PROCEDURE — 83735 ASSAY OF MAGNESIUM: CPT

## 2023-07-04 PROCEDURE — 93306 TTE W/DOPPLER COMPLETE: CPT

## 2023-07-04 RX ADMIN — BICTEGRAVIR SODIUM, EMTRICITABINE, AND TENOFOVIR ALAFENAMIDE FUMARATE 1 TABLET: 50; 200; 25 TABLET ORAL at 08:18

## 2023-07-04 RX ADMIN — PANTOPRAZOLE SODIUM 20 MG: 20 TABLET, DELAYED RELEASE ORAL at 06:01

## 2023-07-04 RX ADMIN — NITROGLYCERIN 0.5 INCH: 20 OINTMENT TOPICAL at 05:47

## 2023-07-04 RX ADMIN — TRAZODONE HYDROCHLORIDE 50 MG: 50 TABLET ORAL at 21:07

## 2023-07-04 RX ADMIN — HYDROXYZINE HYDROCHLORIDE 50 MG: 50 TABLET, FILM COATED ORAL at 21:07

## 2023-07-04 RX ADMIN — MUPIROCIN: 20 OINTMENT TOPICAL at 21:07

## 2023-07-04 RX ADMIN — ESCITALOPRAM OXALATE 5 MG: 10 TABLET ORAL at 08:18

## 2023-07-04 RX ADMIN — MUPIROCIN: 20 OINTMENT TOPICAL at 08:18

## 2023-07-04 NOTE — BH NOTE
Agnieszka De León NP contacted in regards to nitro paste due at 12:00pm and patient /81, radial heart rate 42. Orders received to hold Nitro paste at this time as patient is asymptomatic and heart rate 42. Will continue to monitor.

## 2023-07-04 NOTE — BH NOTE
After palpating pulse rates of 38 and 37 by two different Rns, the on-call psychiatrist and the Unit Director were conferred with. Latest Vital Set was 100 % RA, 140/75, Pulse 37, 18 Resp., 98.0. Patient remains asymptomatic. Per [facility], the on-call Hospitalist working from home was consulted, he recommended having the in-house Hospitalist consulted and he also ordered an EKG. The in-house Hospitalist ordered discontinuing the Atenolol and starting Nitropaste 1/2 inch Every 6 Hours Scheduled. Once the EKG was the resulted and delivered to the in-house hospitalist, orders were discontinue  Atenolol, discontinue Naprosyn, Start Nitropaste. STAT Labs:  TSH, Magnesium, Renal Panel Function. [address] [address] cardiology group. Echocardiogram in AM.    2318:  On-call Psychiatrist notified. Awaiting lab draw and results. 0000--Patient's breathing even and unlabored. 0150--Patient's breathing has continued to be even and unlabored. When patient was assessed for psychiatric issues he denied having any issues. He was in the dayroom watching TV. He did not voice having depression, anxiety, SI or HI.      0330--Call has been placed to Lab. About labs being ordered STAT.     Re-consult order placed for Dr. Marlon Land MD.

## 2023-07-04 NOTE — GROUP NOTE
Group Therapy Note    Date: 7/4/2023    Group Start Time: 3297  Group End Time: 1400  Group Topic: Recreational    [facility]R [facility] [address]        Group Therapy Note    Attendees: 3/6    Recreational Therapist facilitated structured leisure skills group to introduce healthy leisure skills as positive way to cope and manage mood. Patient's Goal:  Client will learn and demonstrate effective coping skills    Notes: Attended group and listened to songs with peers. Was receptive to intervention and responded to prompts from staff. Attended entire session and was attentive during group. Status After Intervention:  Improved    Participation Level: Active Listener    Participation Quality: Attentive      Speech:  normal      Thought Process/Content: Logical      Affective Functioning: Congruent      Mood:  calm       Level of consciousness:  Attentive      Response to Learning: Progressing to goal      Endings: None Reported    Modes of Intervention: Activity      Discipline Responsible: Recreational Therapist      Signature:   LARRY Ordonez

## 2023-07-04 NOTE — BH NOTE
This writer contacted [facility] call center again (2nd attempt) in regards to getting in touch with Dr. Little Penaloza. Confirmed that Dr. Little Penaloza is on call and currently awaiting call back. Patient is due nitro paste and /81 and manual heart rate is 42. Patient is asymptomatic. Dr. Little Penaloza paged to discuss medications and make aware of re-consult.

## 2023-07-04 NOTE — BH NOTE
Attempted to call ECHO and no answer. Supervisor notified to make aware of transthoracic echocardiogram (TTE) order however not a STAT order.

## 2023-07-04 NOTE — BH NOTE
DAYSHIFT NOTE:     Patient presents as pleasant but anxious this morning. Patient stated that he was here to focus on other things with his next steps moving forward but all the test and things that have been occurring with his heart makes him anxious and as if he should put his other goals on hold. Patient discussed that last year he was making 130,000 a year and everything was taken from him by a woman and now he has nothing and he is homeless and he is depressed at the thought of having to start over again. Patient is trying to figure out and plan his next steps moving forward but feels like his heart condition may be interfering with his thoughts at this time. Patient denies SI/HI. Patient is medication compliant except he did not take his norvasc or lisinopril this morning because he stated too many things were being changed around. Patient blood pressure 122/63 this morning and heart rate 58. Otherwise patient is medication compliant. Patient sits in the dayroom and watches TV and is social amongst peers. Attends groups. Patient is up for his meals and snacks. Patient had his echo completed today on the unit and is asking about the results and is eager to know information about his heart. Patient continues to present bradycardic but is asymptomatic. When patient discussed his heart condition with this writer this morning patient stated through all these years the only symptoms that he may have at times is being short winded and that is not even all the time. Close observations continued to ensure patient safety.

## 2023-07-04 NOTE — BH NOTE
This writer contacted [facility] at number [phone] to make aware Dr. Parisa Cota on re-consult for bradycardia. Awaiting call back.

## 2023-07-04 NOTE — GROUP NOTE
Group Therapy Note    Date: 7/4/2023    Group Start Time: 0930  Group End Time: 4475  Group Topic: Education Group - Inpatient    SSR 2 BH NON ACUTE    Hortencia Pretty        Group Therapy Note    Attendees: 4/7      Facilitated structured group discussion to identify stressful feelings, attitudes and behaviors as targets for change. Patient's Goal:  Client will learn and demonstrate effective coping skills    Notes: Did not attend group despite encouragement  Discipline Responsible: Recreational Therapist      Signature:   LARRY Angeles

## 2023-07-05 LAB
ECHO AO ASC DIAM: 3.6 CM
ECHO AO ASCENDING AORTA INDEX: 1.82 CM/M2
ECHO AO ROOT DIAM: 3.1 CM
ECHO AO ROOT INDEX: 1.57 CM/M2
ECHO AV AREA PEAK VELOCITY: 4.8 CM2
ECHO AV AREA VTI: 5.2 CM2
ECHO AV AREA/BSA PEAK VELOCITY: 2.4 CM2/M2
ECHO AV AREA/BSA VTI: 2.6 CM2/M2
ECHO AV CUSP MM: 2.2 CM
ECHO AV MEAN GRADIENT: 5 MMHG
ECHO AV MEAN VELOCITY: 1 M/S
ECHO AV PEAK GRADIENT: 8 MMHG
ECHO AV PEAK VELOCITY: 1.5 M/S
ECHO AV VELOCITY RATIO: 1.33
ECHO AV VTI: 22.8 CM
ECHO BSA: 1.99 M2
ECHO LA AREA 2C: 17.5 CM2
ECHO LA AREA 4C: 14.4 CM2
ECHO LA DIAMETER INDEX: 2.07 CM/M2
ECHO LA DIAMETER: 4.1 CM
ECHO LA MAJOR AXIS: 5 CM
ECHO LA MINOR AXIS: 5.1 CM
ECHO LA TO AORTIC ROOT RATIO: 1.32
ECHO LA VOL 2C: 48 ML (ref 18–58)
ECHO LA VOL 4C: 35 ML (ref 18–58)
ECHO LA VOL BP: 41 ML (ref 18–58)
ECHO LA VOL/BSA BIPLANE: 21 ML/M2 (ref 16–34)
ECHO LA VOLUME INDEX A2C: 24 ML/M2 (ref 16–34)
ECHO LA VOLUME INDEX A4C: 18 ML/M2 (ref 16–34)
ECHO LV E' LATERAL VELOCITY: 12 CM/S
ECHO LV E' SEPTAL VELOCITY: 9 CM/S
ECHO LV EDV A2C: 119 ML
ECHO LV EDV A4C: 149 ML
ECHO LV EDV INDEX A4C: 75 ML/M2
ECHO LV EDV NDEX A2C: 60 ML/M2
ECHO LV EJECTION FRACTION A2C: 70 %
ECHO LV EJECTION FRACTION A4C: 63 %
ECHO LV EJECTION FRACTION BIPLANE: 66 % (ref 55–100)
ECHO LV ESV A2C: 35 ML
ECHO LV ESV A4C: 55 ML
ECHO LV ESV INDEX A2C: 18 ML/M2
ECHO LV ESV INDEX A4C: 28 ML/M2
ECHO LV FRACTIONAL SHORTENING: 43 % (ref 28–44)
ECHO LV INTERNAL DIMENSION DIASTOLE INDEX: 2.83 CM/M2
ECHO LV INTERNAL DIMENSION DIASTOLIC MMODE: 5.5 CM (ref 4.2–5.9)
ECHO LV INTERNAL DIMENSION DIASTOLIC: 5.6 CM (ref 4.2–5.9)
ECHO LV INTERNAL DIMENSION SYSTOLIC INDEX: 1.62 CM/M2
ECHO LV INTERNAL DIMENSION SYSTOLIC MMODE: 3.8 CM
ECHO LV INTERNAL DIMENSION SYSTOLIC: 3.2 CM
ECHO LV IVSD: 0.9 CM (ref 0.6–1)
ECHO LV MASS 2D: 205.5 G (ref 88–224)
ECHO LV MASS INDEX 2D: 103.8 G/M2 (ref 49–115)
ECHO LV POSTERIOR WALL DIASTOLIC MMODE: 1 CM (ref 0.6–1)
ECHO LV POSTERIOR WALL DIASTOLIC: 1 CM (ref 0.6–1)
ECHO LV RELATIVE WALL THICKNESS RATIO: 0.36
ECHO LVOT AREA: 3.5 CM2
ECHO LVOT AV VTI INDEX: 1.5
ECHO LVOT DIAM: 2.1 CM
ECHO LVOT MEAN GRADIENT: 9 MMHG
ECHO LVOT PEAK GRADIENT: 16 MMHG
ECHO LVOT PEAK VELOCITY: 2 M/S
ECHO LVOT STROKE VOLUME INDEX: 60 ML/M2
ECHO LVOT SV: 118.7 ML
ECHO LVOT VTI: 34.3 CM
ECHO MV A VELOCITY: 0.74 M/S
ECHO MV E DECELERATION TIME (DT): 474 MS
ECHO MV E VELOCITY: 0.51 M/S
ECHO MV E/A RATIO: 0.69
ECHO MV E/E' LATERAL: 4.25
ECHO MV E/E' RATIO (AVERAGED): 4.96
ECHO MV E/E' SEPTAL: 5.67
ECHO PV MAX VELOCITY: 1 M/S
ECHO PV PEAK GRADIENT: 4 MMHG
ECHO RA AREA 4C: 11.5 CM2
ECHO RA END SYSTOLIC VOLUME APICAL 4 CHAMBER INDEX BSA: 13 ML/M2
ECHO RA VOLUME: 25 ML
ECHO RV BASAL DIMENSION: 3.5 CM
ECHO RV LONGITUDINAL DIMENSION: 6.4 CM
ECHO RV MID DIMENSION: 2.4 CM
ECHO RV TAPSE: 2.9 CM (ref 1.7–?)
ECHO RVOT MEAN GRADIENT: 2 MMHG
ECHO RVOT PEAK GRADIENT: 5 MMHG
ECHO RVOT PEAK VELOCITY: 1.1 M/S
ECHO RVOT VTI: 21.8 CM
ECHO TV REGURGITANT MAX VELOCITY: 1.48 M/S
ECHO TV REGURGITANT PEAK GRADIENT: 9 MMHG

## 2023-07-05 PROCEDURE — 1240000000 HC EMOTIONAL WELLNESS R&B

## 2023-07-05 PROCEDURE — 6370000000 HC RX 637 (ALT 250 FOR IP): Performed by: PSYCHIATRY & NEUROLOGY

## 2023-07-05 RX ORDER — AMLODIPINE BESYLATE 5 MG/1
5 TABLET ORAL DAILY
Qty: 30 TABLET | Refills: 3 | Status: SHIPPED | OUTPATIENT
Start: 2023-07-05

## 2023-07-05 RX ORDER — BICTEGRAVIR SODIUM, EMTRICITABINE, AND TENOFOVIR ALAFENAMIDE FUMARATE 50; 200; 25 MG/1; MG/1; MG/1
1 TABLET ORAL DAILY
Qty: 30 TABLET | Refills: 1 | Status: SHIPPED | OUTPATIENT
Start: 2023-07-05

## 2023-07-05 RX ORDER — ESCITALOPRAM OXALATE 5 MG/1
5 TABLET ORAL DAILY
Qty: 30 TABLET | Refills: 3 | Status: SHIPPED | OUTPATIENT
Start: 2023-07-06

## 2023-07-05 RX ORDER — TRAZODONE HYDROCHLORIDE 50 MG/1
50 TABLET ORAL NIGHTLY PRN
Qty: 30 TABLET | Refills: 1 | Status: SHIPPED | OUTPATIENT
Start: 2023-07-05

## 2023-07-05 RX ORDER — OMEPRAZOLE 20 MG/1
20 CAPSULE, DELAYED RELEASE ORAL DAILY
Qty: 30 CAPSULE | Refills: 2 | Status: SHIPPED | OUTPATIENT
Start: 2023-07-05 | End: 2024-07-04

## 2023-07-05 RX ORDER — LISINOPRIL 10 MG/1
10 TABLET ORAL DAILY
Qty: 30 TABLET | Refills: 3 | Status: SHIPPED | OUTPATIENT
Start: 2023-07-05

## 2023-07-05 RX ORDER — FLUTICASONE PROPIONATE 220 UG/1
2 AEROSOL, METERED RESPIRATORY (INHALATION) 2 TIMES DAILY PRN
Qty: 1 EACH | Refills: 3 | Status: SHIPPED | OUTPATIENT
Start: 2023-07-05

## 2023-07-05 RX ORDER — PANTOPRAZOLE SODIUM 40 MG/1
40 TABLET, DELAYED RELEASE ORAL DAILY
Status: DISCONTINUED | OUTPATIENT
Start: 2023-07-06 | End: 2023-07-06 | Stop reason: HOSPADM

## 2023-07-05 RX ADMIN — ESCITALOPRAM OXALATE 5 MG: 10 TABLET ORAL at 07:47

## 2023-07-05 RX ADMIN — PANTOPRAZOLE SODIUM 20 MG: 20 TABLET, DELAYED RELEASE ORAL at 07:47

## 2023-07-05 RX ADMIN — MUPIROCIN: 20 OINTMENT TOPICAL at 21:37

## 2023-07-05 RX ADMIN — HYDROXYZINE HYDROCHLORIDE 50 MG: 50 TABLET, FILM COATED ORAL at 21:37

## 2023-07-05 RX ADMIN — MUPIROCIN: 20 OINTMENT TOPICAL at 08:14

## 2023-07-05 RX ADMIN — TRAZODONE HYDROCHLORIDE 50 MG: 50 TABLET ORAL at 21:37

## 2023-07-05 RX ADMIN — ALUMINUM HYDROXIDE, MAGNESIUM HYDROXIDE, AND SIMETHICONE 30 ML: 200; 200; 20 SUSPENSION ORAL at 21:36

## 2023-07-05 RX ADMIN — BICTEGRAVIR SODIUM, EMTRICITABINE, AND TENOFOVIR ALAFENAMIDE FUMARATE 1 TABLET: 50; 200; 25 TABLET ORAL at 08:14

## 2023-07-05 ASSESSMENT — PAIN DESCRIPTION - DESCRIPTORS: DESCRIPTORS: DISCOMFORT

## 2023-07-05 ASSESSMENT — PAIN DESCRIPTION - LOCATION: LOCATION: OTHER (COMMENT)

## 2023-07-05 ASSESSMENT — PAIN SCALES - GENERAL: PAINLEVEL_OUTOF10: 0

## 2023-07-05 NOTE — BH NOTE
DISCHARGE SUMMARY    NAME:Dylon Larsen  : 1970  MRN: [id number]    The patient Maci Rail exhibits the ability to control behavior in a less restrictive environment. Patient's level of functioning is improving. No assaultive/destructive behavior has been observed for the past 24 hours. No suicidal/homicidal threat or behavior has been observed for the past 24 hours. There is no evidence of serious medication side effects. Patient has not been in physical or protective restraints for at least the past 24 hours. If weapons involved, how are they secured? N/a    Is patient aware of and in agreement with discharge plan? yes    Arrangements for medication:  Prescriptions see information in chart    Copy of discharge instructions to provider?:  yes    Arrangements for transportation home:  cab    Keep all follow up appointments as scheduled, continue to take prescribed medications per physician instructions.   Mental health crisis number:  [facility] Emergency WARM LINE      6-933-663-MHAV ([phone])      M-F: 9am to 9pm      Sat & Sun: 5pm - 9pm  National suicide prevention lines:                             2-020-LFFBOPC (8-068-880-882-124-6099)       9-517-207-TALK ([phone])    Crisis Text Line:  Text HOME to 309485

## 2023-07-05 NOTE — PLAN OF CARE
Problem: Safety - Adult  Goal: Maintaining Safety  7/5/2023 0331 by Joselo Ojeda RN  Outcome: Progressing  7/5/2023 0215 by Joselo Ojeda RN  Outcome: Progressing     Problem: Anxiety  Goal: Verbalizes ways to manage anxiety  7/5/2023 0331 by Joselo Ojeda RN  Outcome: Progressing  7/5/2023 0215 by Joselo Ojeda RN  Outcome: Progressing     Problem: Coping  Goal: Effective coping  7/5/2023 0331 by Joselo Ojeda RN  Outcome: Progressing  7/5/2023 0215 by Joselo Ojeda RN  Outcome: Progressing

## 2023-07-05 NOTE — BH NOTE
Nursing Note    Patient is alert and oriented x 4. He denies any SI/HI/AH/VH. Patient denies any anxiety or depression. Restricted affect and calm mood. Patient seen watching TV in the dayroom with peers. He requested Atarax 50 mg PO and Trazodone  50 mg PO for insomnia. Staff will continue to monitor patient for safety.

## 2023-07-05 NOTE — BH NOTE
Behavioral Health Transition Record to Provider    Patient Name: Fany Junior  YOB: 1970  Medical Record Number: [id number]  Date of Admission: 6/29/2023  Date of Discharge: 7/6/23    Attending Provider: Sushila Mas MD  Discharging Provider: Dr Katerin Rosado  To contact this individual call [phone] and ask the  to page. If unavailable, ask to be transferred to [facility] Provider on call. [facility] Provider will be available on call 24/7 and during holidays. Primary Care Provider: ELIF Fajardo    Allergies   Allergen Reactions    Levetiracetam      Other reaction(s): Unknown (comments)       Reason for Admission: SI without plan and increased depression     Admission Diagnosis: Atypical chest pain [R07.89]  Unspecified mood (affective) disorder (HCC) [F39]  Depression, unspecified depression type [F32. A]  Depression [F32. A]    * No surgery found *    Results for orders placed or performed during the hospital encounter of 06/29/23   MRSA by PCR    Specimen: Swab   Result Value Ref Range    MRSA by PCR DETECTED (A) Not Detected     COVID-19 & Influenza Combo    Specimen: Nasopharyngeal   Result Value Ref Range    SARS-CoV-2, PCR Not Detected Not Detected      Rapid Influenza A By PCR Not Detected Not Detected      Rapid Influenza B By PCR Not Detected Not Detected     Lipase   Result Value Ref Range    Lipase 95 73 - 393 U/L   Brain Natriuretic Peptide   Result Value Ref Range    NT Pro- (H) <125 pg/mL   Troponin   Result Value Ref Range    Troponin, High Sensitivity 11 0 - 76 ng/L   Troponin   Result Value Ref Range    Troponin, High Sensitivity 12 0 - 76 ng/L   Magnesium   Result Value Ref Range    Magnesium 1.9 1.6 - 2.4 mg/dL   Comprehensive Metabolic Panel   Result Value Ref Range    Sodium 141 136 - 145 mmol/L    Potassium 4.1 3.5 - 5.1 mmol/L    Chloride 109 (H) 97 - 108 mmol/L    CO2 26 21 - 32 mmol/L    Anion Gap 6 5 - 15 mmol/L    Glucose 180 (H) 65 - 100

## 2023-07-05 NOTE — BH NOTE
Called Dr. Cristela Kanner at [phone] to go over ordered tests and abnormal labs drawn on 7/4/2023.    9660- Writer contacted hospitalist to follow up with pt. Pt rates depression 7/10 regarding situation that brought him to the facility and anxiety 10/10 due to not knowing the outcome of the cardiac tests performed. Pt would like a doctor to come and explain the results to him. Pt denies SI/HI/AVH. No pain or physical complaints at this time. Pt refused cardiac medications due to pt hr less than 55 as given directions by cardiologist.     5269- Dr. Gabrielle Hook called nurses station and informed about pt condition and needing follow up.

## 2023-07-05 NOTE — BH NOTE
Pt was not seen by cardiologist or hospitalist during this shift despite being notified of pt condition. See previous note.

## 2023-07-05 NOTE — BH NOTE
Behavioral Health Treatment Team Note     Patient goal(s) for today: \"want to go to groups\"  Treatment team focus/goals: meds management, group/individual therapy, dc planning    Progress note: pt presented as alert/oriented/engaging in conversation, motivated, respectful, pleasant upon approach, insightful, calm, compliant. Pt denied current si/hi/ah/vh. Pt did voice some anxiety but did identify that it was secondary to his medical issue. Pt cont to meet criteria for inpt stay to cont stabilization on meds    LOS:  5  Expected LOS: 5-7    Insurance info/prescription coverage:  batres  Date of last family contact:  n/a.  Pt did not identify any support  Family requesting physician contact today:  No  Discharge plan:  [address] [place] [address] at [phone] in the home:  No   Outpatient provider(s):  [address] [place] [address]     Participating treatment team members: Joshua Feldman, * (assigned SW), Amira Guy, MS

## 2023-07-05 NOTE — BH NOTE
Per Dr. Enzo Quinonez, plan is to discharge pt tomorrow pending results from consult with cardiology/hospitalist.

## 2023-07-06 ENCOUNTER — CLINICAL DOCUMENTATION (OUTPATIENT)
Facility: HOSPITAL | Age: 53
End: 2023-07-06

## 2023-07-06 VITALS
HEIGHT: 69 IN | RESPIRATION RATE: 16 BRPM | DIASTOLIC BLOOD PRESSURE: 72 MMHG | TEMPERATURE: 97.7 F | OXYGEN SATURATION: 100 % | HEART RATE: 59 BPM | BODY MASS INDEX: 26.66 KG/M2 | SYSTOLIC BLOOD PRESSURE: 125 MMHG | WEIGHT: 180 LBS

## 2023-07-06 PROCEDURE — 6370000000 HC RX 637 (ALT 250 FOR IP): Performed by: PSYCHIATRY & NEUROLOGY

## 2023-07-06 RX ADMIN — BICTEGRAVIR SODIUM, EMTRICITABINE, AND TENOFOVIR ALAFENAMIDE FUMARATE 1 TABLET: 50; 200; 25 TABLET ORAL at 10:04

## 2023-07-06 RX ADMIN — AMLODIPINE BESYLATE 5 MG: 5 TABLET ORAL at 09:53

## 2023-07-06 RX ADMIN — ESCITALOPRAM OXALATE 5 MG: 10 TABLET ORAL at 09:53

## 2023-07-06 RX ADMIN — LISINOPRIL 10 MG: 10 TABLET ORAL at 09:55

## 2023-07-06 RX ADMIN — PANTOPRAZOLE SODIUM 40 MG: 40 TABLET, DELAYED RELEASE ORAL at 09:52

## 2023-07-06 RX ADMIN — MUPIROCIN 1 EACH: 20 OINTMENT TOPICAL at 10:05

## 2023-07-06 ASSESSMENT — ENCOUNTER SYMPTOMS
GASTROINTESTINAL NEGATIVE: 1
RESPIRATORY NEGATIVE: 1

## 2023-07-06 NOTE — BH NOTE
Pt visible on the unit; has been in the milieu watching T.V.; attended group; accepted snacks/drink; requested prn maalox; Nitro ointment held - pt sleeping/refused any BP medication; pt rated his anxiety a 7/10 and his depression an 8/10; pt related his depression to being homeless and making a transition tomorrow - pt stated that he will be discharged; pt denies having any SI/HI or A/VH; pt has an affect that is WNLs; he remains polite, cooperative, and pleasant. Close observation maintained for safety. The pt has rested quietly in bed throughout the night. No distress noted or voiced. Respirations are quiet and unlabored.

## 2023-07-06 NOTE — BH NOTE
Spoke to Robin at [facility] to send transportation for pt at approx 2:30 to take him to the [place] office directly. Unit staff and pt made aware of plan.

## 2023-07-06 NOTE — BH NOTE
Bruceus Maker is alert, oriented, calm and cooperative. He has been looking forward to discharge today. He denies any SI/HI or A/V/H and  he continues to report some anxiety 4/10. He took all his morning medications and voiced no physical complaints prior to discharge. He will be going to [facility] with paper scripts. Counselor walked him out with all his belongings to meet NILDA.

## 2023-07-06 NOTE — GROUP NOTE
Group Therapy Note    Date: 7/6/2023    Group Start Time: 1120  Group End Time: 1200  Group Topic: Recreational    SSR 2 BH NON ACUTE    Bradley Hernandez        Group Therapy Note    Facilitated leisure skills group to reinforce positive coping and to manage mood through music, social interaction, group activities and art task    Attendees: 5/9       Patient's Goal:  Client will learn and demonstrate effective coping skills    Notes:  Receptive to listening to music  while working on leisure task. Interacted with peers and staff    Status After Intervention:  Improved    Participation Level:  Active Listener and Interactive    Participation Quality: Appropriate and Attentive      Speech:  normal      Thought Process/Content: Logical      Affective Functioning: Congruent      Mood:  Calm      Level of consciousness:  Attentive      Response to Learning: Progressing to goal      Endings: None Reported    Modes of Intervention: Socialization and Activity      Discipline Responsible: Recreational Therapist      Signature:  Orquidea Jimenez

## 2023-07-06 NOTE — GROUP NOTE
Group Therapy Note    Date: 7/5/2023    Group Start Time: 1910  Group End Time: 2015  Group Topic: Recreational    SSR 2 BH NON ACUTE    Ella Puentes        Group Therapy Note    Attendees: 5/9    Recreational Therapist facilitated structured leisure skills group to introduce healthy leisure skills as positive way to cope and manage mood. Patient's Goal:  Client will learn and demonstrate effective coping skills    Notes: Attended group. Listened to songs played in group. Was cooperative and receptive to intervention. Responded to staff with cues/prompts and encouragement to participate. Status After Intervention:  Improved    Participation Level: Active Listener    Participation Quality: Appropriate      Speech:  normal      Thought Process/Content: Logical      Affective Functioning: Congruent      Mood: calm       Level of consciousness:  Alert      Response to Learning: Progressing to goal      Endings: None Reported    Modes of Intervention: Activity      Discipline Responsible: Recreational Therapist      Signature:   LARRY Pérez

## 2023-07-06 NOTE — PLAN OF CARE
Problem: Safety - Adult  Goal: Maintaining Safety  Outcome: Progressing     Problem: Coping  Goal: Effective coping  Outcome: Progressing     Problem: Self Harm/Suicidality  Goal: Will have no self-injury during hospital stay  Description: INTERVENTIONS:  1. Ensure constant observer at bedside with Q15M safety checks  2. Maintain a safe environment  3. Secure patient belongings  4. Ensure family/visitors adhere to safety recommendations  5. Ensure safety tray has been added to patient's diet order  6. Every shift and PRN: Re-assess suicidal risk via Frequent Screener    Outcome: Progressing     Problem: Behavior  Goal: Pt/Family maintain appropriate behavior and adhere to behavioral management agreement, if implemented  Description: INTERVENTIONS:  1. Assess patient/family's coping skills and  non-compliant behavior (including use of illegal substances)  2. Notify security of behavior or suspected illegal substances which indicate the need for search of the family and/or belongings  3. Encourage verbalization of thoughts and concerns in a socially appropriate manner  4. Utilize positive, consistent limit setting strategies supporting safety of patient, staff and others  5. Encourage participation in the decision making process about the behavioral management agreement  6. If a visitor's behavior poses a threat to safety call refer to organization policy. 7. Initiate consult with , Psychosocial CNS, Spiritual Care as appropriate  Outcome: Progressing     -safety maintained; no self injurious behavior noted or reported.  -pt is cooperative; attends group: up in the milieu watching T.V.  -pt denies having any suicidal thoughts; no self injurious behavior noted or reported. -pt has been calm and cooperative; appropriate behavior maintained.

## 2024-03-02 ENCOUNTER — HOSPITAL ENCOUNTER (INPATIENT)
Facility: HOSPITAL | Age: 54
LOS: 5 days | Discharge: HOME | DRG: 885 | End: 2024-03-07
Attending: PSYCHIATRY & NEUROLOGY | Admitting: PSYCHIATRY & NEUROLOGY

## 2024-03-02 ENCOUNTER — APPOINTMENT (OUTPATIENT)
Facility: HOSPITAL | Age: 54
DRG: 885 | End: 2024-03-02

## 2024-03-02 DIAGNOSIS — R45.851 SUICIDAL IDEATIONS: ICD-10-CM

## 2024-03-02 DIAGNOSIS — R06.02 SOB (SHORTNESS OF BREATH): ICD-10-CM

## 2024-03-02 DIAGNOSIS — M79.641 RIGHT HAND PAIN: Primary | ICD-10-CM

## 2024-03-02 LAB
ALBUMIN SERPL-MCNC: 3.7 G/DL (ref 3.5–5)
ALBUMIN/GLOB SERPL: 0.9 (ref 1.1–2.2)
ALP SERPL-CCNC: 76 U/L (ref 45–117)
ALT SERPL-CCNC: 40 U/L (ref 12–78)
AMPHET UR QL SCN: POSITIVE
ANION GAP SERPL CALC-SCNC: 3 MMOL/L (ref 5–15)
APPEARANCE UR: CLEAR
AST SERPL W P-5'-P-CCNC: 67 U/L (ref 15–37)
BACTERIA URNS QL MICRO: NEGATIVE /HPF
BARBITURATES UR QL SCN: NEGATIVE
BASOPHILS # BLD: 0 K/UL (ref 0–0.1)
BASOPHILS NFR BLD: 1 % (ref 0–1)
BENZODIAZ UR QL: NEGATIVE
BILIRUB SERPL-MCNC: 0.2 MG/DL (ref 0.2–1)
BILIRUB UR QL: NEGATIVE
BUN SERPL-MCNC: 24 MG/DL (ref 6–20)
BUN/CREAT SERPL: 16 (ref 12–20)
CA-I BLD-MCNC: 8.8 MG/DL (ref 8.5–10.1)
CANNABINOIDS UR QL SCN: NEGATIVE
CHLORIDE SERPL-SCNC: 109 MMOL/L (ref 97–108)
CO2 SERPL-SCNC: 26 MMOL/L (ref 21–32)
COCAINE UR QL SCN: POSITIVE
COLOR UR: ABNORMAL
CREAT SERPL-MCNC: 1.54 MG/DL (ref 0.7–1.3)
DIFFERENTIAL METHOD BLD: NORMAL
EOSINOPHIL # BLD: 0 K/UL (ref 0–0.4)
EOSINOPHIL NFR BLD: 0 % (ref 0–7)
EPITH CASTS URNS QL MICRO: ABNORMAL /LPF
ERYTHROCYTE [DISTWIDTH] IN BLOOD BY AUTOMATED COUNT: 12.6 % (ref 11.5–14.5)
ETHANOL SERPL-MCNC: <10 MG/DL (ref 0–0.08)
FLUAV AG NPH QL IA: NEGATIVE
FLUAV RNA SPEC QL NAA+PROBE: NOT DETECTED
FLUBV AG NOSE QL IA: NEGATIVE
FLUBV RNA SPEC QL NAA+PROBE: NOT DETECTED
GLOBULIN SER CALC-MCNC: 4 G/DL (ref 2–4)
GLUCOSE SERPL-MCNC: 134 MG/DL (ref 65–100)
GLUCOSE UR STRIP.AUTO-MCNC: NEGATIVE MG/DL
HCT VFR BLD AUTO: 39.5 % (ref 36.6–50.3)
HGB BLD-MCNC: 13.1 G/DL (ref 12.1–17)
HGB UR QL STRIP: NEGATIVE
IMM GRANULOCYTES # BLD AUTO: 0 K/UL (ref 0–0.04)
IMM GRANULOCYTES NFR BLD AUTO: 0 % (ref 0–0.5)
KETONES UR QL STRIP.AUTO: NEGATIVE MG/DL
LEUKOCYTE ESTERASE UR QL STRIP.AUTO: NEGATIVE
LYMPHOCYTES # BLD: 2.1 K/UL (ref 0.8–3.5)
LYMPHOCYTES NFR BLD: 36 % (ref 12–49)
Lab: ABNORMAL
MCH RBC QN AUTO: 29.8 PG (ref 26–34)
MCHC RBC AUTO-ENTMCNC: 33.2 G/DL (ref 30–36.5)
MCV RBC AUTO: 89.8 FL (ref 80–99)
METHADONE UR QL: NEGATIVE
MONOCYTES # BLD: 0.3 K/UL (ref 0–1)
MONOCYTES NFR BLD: 5 % (ref 5–13)
MUCOUS THREADS URNS QL MICRO: ABNORMAL /LPF
NEUTS SEG # BLD: 3.4 K/UL (ref 1.8–8)
NEUTS SEG NFR BLD: 58 % (ref 32–75)
NITRITE UR QL STRIP.AUTO: NEGATIVE
NRBC # BLD: 0 K/UL (ref 0–0.01)
NRBC BLD-RTO: 0 PER 100 WBC
OPIATES UR QL: NEGATIVE
PCP UR QL: NEGATIVE
PH UR STRIP: 5 (ref 5–8)
PLATELET # BLD AUTO: 185 K/UL (ref 150–400)
PMV BLD AUTO: 11.3 FL (ref 8.9–12.9)
POTASSIUM SERPL-SCNC: 4.7 MMOL/L (ref 3.5–5.1)
PROT SERPL-MCNC: 7.7 G/DL (ref 6.4–8.2)
PROT UR STRIP-MCNC: 30 MG/DL
RBC # BLD AUTO: 4.4 M/UL (ref 4.1–5.7)
RBC #/AREA URNS HPF: ABNORMAL /HPF (ref 0–5)
SALICYLATES SERPL-MCNC: <1.7 MG/DL (ref 2.8–20)
SARS-COV-2 RDRP RESP QL NAA+PROBE: NOT DETECTED
SARS-COV-2 RNA RESP QL NAA+PROBE: NOT DETECTED
SODIUM SERPL-SCNC: 138 MMOL/L (ref 136–145)
SP GR UR REFRACTOMETRY: 1.03 (ref 1–1.03)
UROBILINOGEN UR QL STRIP.AUTO: 2 EU/DL (ref 0.1–1)
WBC # BLD AUTO: 5.9 K/UL (ref 4.1–11.1)
WBC URNS QL MICRO: ABNORMAL /HPF (ref 0–4)

## 2024-03-02 PROCEDURE — 82077 ASSAY SPEC XCP UR&BREATH IA: CPT

## 2024-03-02 PROCEDURE — 81001 URINALYSIS AUTO W/SCOPE: CPT

## 2024-03-02 PROCEDURE — 2580000003 HC RX 258: Performed by: PHYSICIAN ASSISTANT

## 2024-03-02 PROCEDURE — 80053 COMPREHEN METABOLIC PANEL: CPT

## 2024-03-02 PROCEDURE — 1100000000 HC RM PRIVATE

## 2024-03-02 PROCEDURE — 85025 COMPLETE CBC W/AUTO DIFF WBC: CPT

## 2024-03-02 PROCEDURE — 93005 ELECTROCARDIOGRAM TRACING: CPT

## 2024-03-02 PROCEDURE — 80179 DRUG ASSAY SALICYLATE: CPT

## 2024-03-02 PROCEDURE — 87636 SARSCOV2 & INF A&B AMP PRB: CPT

## 2024-03-02 PROCEDURE — 99285 EMERGENCY DEPT VISIT HI MDM: CPT

## 2024-03-02 PROCEDURE — 87804 INFLUENZA ASSAY W/OPTIC: CPT

## 2024-03-02 PROCEDURE — 73110 X-RAY EXAM OF WRIST: CPT

## 2024-03-02 PROCEDURE — 87635 SARS-COV-2 COVID-19 AMP PRB: CPT

## 2024-03-02 PROCEDURE — 6370000000 HC RX 637 (ALT 250 FOR IP)

## 2024-03-02 PROCEDURE — 73130 X-RAY EXAM OF HAND: CPT

## 2024-03-02 PROCEDURE — 80307 DRUG TEST PRSMV CHEM ANLYZR: CPT

## 2024-03-02 PROCEDURE — 36415 COLL VENOUS BLD VENIPUNCTURE: CPT

## 2024-03-02 RX ORDER — 0.9 % SODIUM CHLORIDE 0.9 %
1000 INTRAVENOUS SOLUTION INTRAVENOUS ONCE
Status: COMPLETED | OUTPATIENT
Start: 2024-03-02 | End: 2024-03-03

## 2024-03-02 RX ORDER — ACETAMINOPHEN 500 MG
1000 TABLET ORAL
Status: COMPLETED | OUTPATIENT
Start: 2024-03-02 | End: 2024-03-02

## 2024-03-02 RX ORDER — IBUPROFEN 600 MG/1
600 TABLET ORAL
Status: COMPLETED | OUTPATIENT
Start: 2024-03-02 | End: 2024-03-02

## 2024-03-02 RX ORDER — OXYCODONE HYDROCHLORIDE AND ACETAMINOPHEN 5; 325 MG/1; MG/1
1 TABLET ORAL
Status: DISCONTINUED | OUTPATIENT
Start: 2024-03-02 | End: 2024-03-02

## 2024-03-02 RX ADMIN — ACETAMINOPHEN 1000 MG: 500 TABLET ORAL at 17:40

## 2024-03-02 RX ADMIN — SODIUM CHLORIDE 1000 ML: 9 INJECTION, SOLUTION INTRAVENOUS at 18:49

## 2024-03-02 RX ADMIN — IBUPROFEN 600 MG: 600 TABLET, FILM COATED ORAL at 17:40

## 2024-03-02 ASSESSMENT — PAIN - FUNCTIONAL ASSESSMENT: PAIN_FUNCTIONAL_ASSESSMENT: 0-10

## 2024-03-02 ASSESSMENT — PAIN DESCRIPTION - ORIENTATION: ORIENTATION: RIGHT

## 2024-03-02 ASSESSMENT — PAIN DESCRIPTION - LOCATION: LOCATION: HAND

## 2024-03-02 ASSESSMENT — PAIN SCALES - GENERAL
PAINLEVEL_OUTOF10: 10
PAINLEVEL_OUTOF10: 10

## 2024-03-02 NOTE — ED TRIAGE NOTES
R hand pain and swelling. Reports being smashed by 4x4 while working on fence. Reports feeling depressed, emotional after losing home and relationship.

## 2024-03-02 NOTE — ED PROVIDER NOTES
bictegravir-emtricitab-tenofovir alafenamide (BIKTARVY) -25 MG per tablet 1 tablet  1 tablet Oral Daily Tejal Dow MD   1 tablet at 03/03/24 1741    escitalopram (LEXAPRO) tablet 5 mg  5 mg Oral Daily Tejal Dow MD   5 mg at 03/03/24 0822    fluticasone (FLOVENT HFA) 220 MCG/ACT inhaler 2 puff  2 puff Inhalation BID PRN Tejal Dow MD        lisinopril (PRINIVIL;ZESTRIL) tablet 10 mg  10 mg Oral Daily Tejal Dow MD   10 mg at 03/03/24 0823    albuterol sulfate HFA (PROVENTIL;VENTOLIN;PROAIR) 108 (90 Base) MCG/ACT inhaler 2 puff  2 puff Inhalation Q6H PRN Tejal Dow MD        traZODone (DESYREL) tablet 50 mg  50 mg Oral Nightly PRN Tejal Dow MD        pantoprazole (PROTONIX) tablet 40 mg  40 mg Oral QAM AC Tejal Dow MD   40 mg at 03/03/24 0822    ibuprofen (ADVIL;MOTRIN) tablet 600 mg  600 mg Oral Q8H PRN Tejal Dow MD        hydrOXYzine HCl (ATARAX) tablet 50 mg  50 mg Oral TID PRN Tejal Dow MD        aluminum & magnesium hydroxide-simethicone (MAALOX) 200-200-20 MG/5ML suspension 30 mL  30 mL Oral Q6H PRN Tejal Dow MD        magnesium hydroxide (MILK OF MAGNESIA) 400 MG/5ML suspension 30 mL  30 mL Oral Daily PRN Tejal Dow MD        acetaminophen (TYLENOL) tablet 650 mg  650 mg Oral Q4H PRN Tejal Dow MD           Social Determinants of Health:   Social Determinants of Health     Tobacco Use: Low Risk  (3/3/2024)    Patient History     Smoking Tobacco Use: Never     Smokeless Tobacco Use: Never     Passive Exposure: Not on file   Alcohol Use: Not At Risk (3/3/2024)    AUDIT-C     Frequency of Alcohol Consumption: Never     Average Number of Drinks: Patient does not drink     Frequency of Binge Drinking: Never   Financial Resource Strain: Not on file   Food Insecurity: Not on file   Transportation Needs: Not on file   Physical Activity: Not on file   Stress: Not on file   Social Connections: Not on file   Intimate Partner Violence: Not on file   Depression: Not on file   Housing Stability: Not  Given 3/3/24 1741)   escitalopram (LEXAPRO) tablet 5 mg (5 mg Oral Given 3/3/24 0822)   fluticasone (FLOVENT HFA) 220 MCG/ACT inhaler 2 puff (has no administration in time range)   lisinopril (PRINIVIL;ZESTRIL) tablet 10 mg (10 mg Oral Given 3/3/24 0823)   albuterol sulfate HFA (PROVENTIL;VENTOLIN;PROAIR) 108 (90 Base) MCG/ACT inhaler 2 puff (has no administration in time range)   traZODone (DESYREL) tablet 50 mg (has no administration in time range)   pantoprazole (PROTONIX) tablet 40 mg (40 mg Oral Given 3/3/24 0822)   ibuprofen (ADVIL;MOTRIN) tablet 600 mg (has no administration in time range)   hydrOXYzine HCl (ATARAX) tablet 50 mg (has no administration in time range)   aluminum & magnesium hydroxide-simethicone (MAALOX) 200-200-20 MG/5ML suspension 30 mL (has no administration in time range)   magnesium hydroxide (MILK OF MAGNESIA) 400 MG/5ML suspension 30 mL (has no administration in time range)   acetaminophen (TYLENOL) tablet 650 mg (has no administration in time range)   ibuprofen (ADVIL;MOTRIN) tablet 600 mg (600 mg Oral Given 3/2/24 1740)   acetaminophen (TYLENOL) tablet 1,000 mg (1,000 mg Oral Given 3/2/24 1740)   sodium chloride 0.9 % bolus 1,000 mL (0 mLs IntraVENous Stopped 3/3/24 0011)       CONSULTS:   IP CONSULT TO BSMART  IP CONSULT TO CASE MANAGEMENT  IP CONSULT TO INTERNAL MEDICINE  IP CONSULT TO CARDIOLOGY     Social Determinants affecting Diagnosis/Treatment: None    Smoking Cessation: Not Applicable    PROCEDURES   Unless otherwise noted above, none.  Procedures      CRITICAL CARE TIME   Patient does not meet Critical Care Time, 0 minutes    FINAL IMPRESSION     1. Right hand pain    2. Suicidal ideations          DISPOSITION/PLAN   DISPOSITION Admitted 03/02/2024 11:03:46 PM    Admit Note: Pt is being admitted by Dr. Dow . The results of their tests and reason(s) for their admission have been discussed with pt and/or available family. They convey agreement and understanding for the need to

## 2024-03-03 LAB
EKG ATRIAL RATE: 88 BPM
EKG DIAGNOSIS: NORMAL
EKG P AXIS: 74 DEGREES
EKG P-R INTERVAL: 124 MS
EKG Q-T INTERVAL: 366 MS
EKG QRS DURATION: 80 MS
EKG QTC CALCULATION (BAZETT): 442 MS
EKG R AXIS: 79 DEGREES
EKG T AXIS: 79 DEGREES
EKG VENTRICULAR RATE: 88 BPM
MRSA DNA SPEC QL NAA+PROBE: DETECTED
TSH SERPL DL<=0.05 MIU/L-ACNC: 1.9 UIU/ML (ref 0.36–3.74)

## 2024-03-03 PROCEDURE — 36415 COLL VENOUS BLD VENIPUNCTURE: CPT

## 2024-03-03 PROCEDURE — 1100000000 HC RM PRIVATE

## 2024-03-03 PROCEDURE — 87641 MR-STAPH DNA AMP PROBE: CPT

## 2024-03-03 PROCEDURE — 6370000000 HC RX 637 (ALT 250 FOR IP): Performed by: PSYCHIATRY & NEUROLOGY

## 2024-03-03 PROCEDURE — 84443 ASSAY THYROID STIM HORMONE: CPT

## 2024-03-03 PROCEDURE — 93005 ELECTROCARDIOGRAM TRACING: CPT

## 2024-03-03 RX ORDER — TRAZODONE HYDROCHLORIDE 50 MG/1
50 TABLET ORAL NIGHTLY PRN
Status: DISCONTINUED | OUTPATIENT
Start: 2024-03-03 | End: 2024-03-04

## 2024-03-03 RX ORDER — IBUPROFEN 600 MG/1
600 TABLET ORAL EVERY 8 HOURS PRN
Status: DISCONTINUED | OUTPATIENT
Start: 2024-03-03 | End: 2024-03-07 | Stop reason: HOSPADM

## 2024-03-03 RX ORDER — PANTOPRAZOLE SODIUM 40 MG/1
40 TABLET, DELAYED RELEASE ORAL
Status: DISCONTINUED | OUTPATIENT
Start: 2024-03-03 | End: 2024-03-07 | Stop reason: HOSPADM

## 2024-03-03 RX ORDER — ESCITALOPRAM OXALATE 10 MG/1
5 TABLET ORAL DAILY
Status: DISCONTINUED | OUTPATIENT
Start: 2024-03-03 | End: 2024-03-04

## 2024-03-03 RX ORDER — AMLODIPINE BESYLATE 5 MG/1
5 TABLET ORAL DAILY
Status: DISCONTINUED | OUTPATIENT
Start: 2024-03-03 | End: 2024-03-07 | Stop reason: HOSPADM

## 2024-03-03 RX ORDER — FLUTICASONE PROPIONATE 220 UG/1
2 AEROSOL, METERED RESPIRATORY (INHALATION) 2 TIMES DAILY PRN
Status: DISCONTINUED | OUTPATIENT
Start: 2024-03-03 | End: 2024-03-07 | Stop reason: HOSPADM

## 2024-03-03 RX ORDER — HYDROXYZINE 50 MG/1
50 TABLET, FILM COATED ORAL 3 TIMES DAILY PRN
Status: DISCONTINUED | OUTPATIENT
Start: 2024-03-03 | End: 2024-03-07 | Stop reason: HOSPADM

## 2024-03-03 RX ORDER — ALBUTEROL SULFATE 90 UG/1
2 AEROSOL, METERED RESPIRATORY (INHALATION) EVERY 6 HOURS PRN
Status: DISCONTINUED | OUTPATIENT
Start: 2024-03-03 | End: 2024-03-07 | Stop reason: HOSPADM

## 2024-03-03 RX ORDER — MAGNESIUM HYDROXIDE/ALUMINUM HYDROXICE/SIMETHICONE 120; 1200; 1200 MG/30ML; MG/30ML; MG/30ML
30 SUSPENSION ORAL EVERY 6 HOURS PRN
Status: DISCONTINUED | OUTPATIENT
Start: 2024-03-03 | End: 2024-03-07 | Stop reason: HOSPADM

## 2024-03-03 RX ORDER — ACETAMINOPHEN 325 MG/1
650 TABLET ORAL EVERY 4 HOURS PRN
Status: DISCONTINUED | OUTPATIENT
Start: 2024-03-03 | End: 2024-03-07 | Stop reason: HOSPADM

## 2024-03-03 RX ORDER — LISINOPRIL 10 MG/1
10 TABLET ORAL DAILY
Status: DISCONTINUED | OUTPATIENT
Start: 2024-03-03 | End: 2024-03-07 | Stop reason: HOSPADM

## 2024-03-03 RX ADMIN — HYDROXYZINE HYDROCHLORIDE 50 MG: 50 TABLET, FILM COATED ORAL at 21:04

## 2024-03-03 RX ADMIN — ESCITALOPRAM OXALATE 5 MG: 10 TABLET ORAL at 08:22

## 2024-03-03 RX ADMIN — PANTOPRAZOLE SODIUM 40 MG: 40 TABLET, DELAYED RELEASE ORAL at 08:22

## 2024-03-03 RX ADMIN — LISINOPRIL 10 MG: 10 TABLET ORAL at 08:23

## 2024-03-03 RX ADMIN — AMLODIPINE BESYLATE 5 MG: 5 TABLET ORAL at 08:23

## 2024-03-03 RX ADMIN — BICTEGRAVIR SODIUM, EMTRICITABINE, AND TENOFOVIR ALAFENAMIDE FUMARATE 1 TABLET: 50; 200; 25 TABLET ORAL at 17:41

## 2024-03-03 RX ADMIN — TRAZODONE HYDROCHLORIDE 50 MG: 50 TABLET ORAL at 21:04

## 2024-03-03 RX ADMIN — IBUPROFEN 600 MG: 600 TABLET, FILM COATED ORAL at 21:04

## 2024-03-03 ASSESSMENT — SLEEP AND FATIGUE QUESTIONNAIRES
DO YOU HAVE DIFFICULTY SLEEPING: YES
SLEEP PATTERN: INSOMNIA
DO YOU USE A SLEEP AID: NO
AVERAGE NUMBER OF SLEEP HOURS: 3

## 2024-03-03 ASSESSMENT — PAIN DESCRIPTION - ORIENTATION: ORIENTATION: RIGHT

## 2024-03-03 ASSESSMENT — PAIN DESCRIPTION - DESCRIPTORS: DESCRIPTORS: THROBBING

## 2024-03-03 ASSESSMENT — PAIN SCALES - GENERAL
PAINLEVEL_OUTOF10: 0
PAINLEVEL_OUTOF10: 9
PAINLEVEL_OUTOF10: 5

## 2024-03-03 ASSESSMENT — PAIN DESCRIPTION - PAIN TYPE: TYPE: ACUTE PAIN

## 2024-03-03 ASSESSMENT — PAIN DESCRIPTION - FREQUENCY: FREQUENCY: CONTINUOUS

## 2024-03-03 ASSESSMENT — PAIN DESCRIPTION - ONSET: ONSET: OTHER (COMMENT)

## 2024-03-03 ASSESSMENT — PAIN DESCRIPTION - LOCATION: LOCATION: HAND

## 2024-03-03 NOTE — BSMART NOTE
Per ACCESS, pt is accepted by Dr. Dow to [facility] 231-1. ACCESS asked that the ED obtain swab for MRSA culture - pt's chart is still flagged for this since June 2023 so if the culture comes back negative, pt's room does NOT need to be blocked and he can have a roommate. Attending ED RN Valery VALIENTE agreed to obtain swab.  
quality of school experience being described as OK.  The patient is currently unemployed and speaks English as a primary language.  The patient has no communication impairments affecting communication. The patient's preference for learning can be described as: can read and write adequately.  The patient's hearing is normal.  The patient's vision is normal.      Carri Irby RN

## 2024-03-03 NOTE — GROUP NOTE
Group Therapy Note    Date: 3/3/2024    Group Start Time: 0930  Group End Time: 1015  Group Topic: Education Group - Inpatient    SSR 2 BH NON ACUTE    Sidra Woodward        Group Therapy Note    Attendees: 5/9    Facilitated structured group discussion to identify protective factors that have been valuable and protective factors that could be improved in managing stressors.           Notes:  Did not attend group despite encouragement      Discipline Responsible: Recreational Therapist      Signature:  LARRY Shelley
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
## 2024-03-03 NOTE — ED NOTES
Report called to JOSE M Allen on the behavioral health unit.  All questions answered, all pertinent information given to receiving nurse.  Pt stable for transport to floor.

## 2024-03-03 NOTE — GROUP NOTE
Group Therapy Note    Date: 3/3/2024    Group Start Time: 1315  Group End Time: 1400  Group Topic: Recreational    SSR 2 BH NON ACUTE    Sidra Woodward        Group Therapy Note    Attendees: 5/8       Recreational Therapist facilitated structured leisure skills group to introduce healthy leisure skills as positive way to cope and manage mood.          Notes:  Did not attend group despite encouragement    Discipline Responsible: Recreational Therapist      Signature:  LARRY Shelley
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
## 2024-03-03 NOTE — ED NOTES
Pt's tool bag found in room with metal tools and other objects.  Also found was a .  All items removed from room by BERRY Holloway and given to security to lock away.

## 2024-03-03 NOTE — BH NOTE
Client is pleasant and cooperative.Alert and oriented  x Client encouraged to attend scheduled groups and unit activities.3.He has a good appetite and reports sleeping well. Right hand swollen.Admits to feeling suicidal at this time but still feels depressed.Remains on close observation for safety.

## 2024-03-03 NOTE — CONSULTS
Hospitalist Consultation Note    NAME:  Dylon Willard   :   1970   MRN:   [id number]     ATTENDING: Tejal Dow MD  PCP:  Ifeanyi Arriola PA    Date/Time:  3/3/2024 1:59 PM      Recommendations/Plan:       Principal Problem:    Unspecified mood (affective) disorder (HCC)  Resolved Problems:    * No resolved hospital problems. *     Hiv  Htn  Gerd  bradicardia  Amphetamine and cocaine positive    Code Status:   DVT Prophylaxis:   Cardiology consult obtain twelve-lead EKG and also obtain TSH        Subjective:   REQUESTING PHYSICIAN:  REASON FOR CONSULT:      Dylon is a 53 y.o.   male who I was asked to see for inpatient psychiatric admission denies chest pain no shortness of breath no cough no chills patient is drowsy has a past history of HIV hypertension replug esophagitis            Past Medical History:   Diagnosis Date    Abscess     Arrhythmia     Bronchitis     Cellulitis     Depression     GERD (gastroesophageal reflux disease)     HIV (human immunodeficiency virus infection) ([facility])     MRSA (methicillin resistant Staphylococcus aureus)       Past Surgical History:   Procedure Laterality Date    CT ABSCESS DRAINAGE NECK THORAX  2022    CT ABSCESS DRAINAGE NECK THORAX 2022     Social History     Tobacco Use    Smoking status: Never    Smokeless tobacco: Never   Substance Use Topics    Alcohol use: No      Family History   Problem Relation Age of Onset    Hypertension Mother        Allergies   Allergen Reactions    Levetiracetam      Flu-like symptoms: fever, headache, shakes, body aches.      Prior to Admission medications    Medication Sig Start Date End Date Taking? Authorizing Provider   nitroglycerin (NITRO-BID) 2 % ointment Place 0.5 inches onto the skin in the morning and 0.5 inches at noon and 0.5 inches in the evening and 0.5 inches before bedtime.  Patient not taking: Reported on 3/3/2024 7/5/23   Tejal Dow MD   fluticasone (FLOVENT HFA) 220 MCG/ACT inhaler

## 2024-03-04 LAB
EKG ATRIAL RATE: 98 BPM
EKG DIAGNOSIS: NORMAL
EKG P AXIS: 79 DEGREES
EKG P-R INTERVAL: 134 MS
EKG Q-T INTERVAL: 360 MS
EKG QRS DURATION: 76 MS
EKG QTC CALCULATION (BAZETT): 459 MS
EKG R AXIS: 77 DEGREES
EKG T AXIS: 81 DEGREES
EKG VENTRICULAR RATE: 98 BPM

## 2024-03-04 PROCEDURE — 6370000000 HC RX 637 (ALT 250 FOR IP): Performed by: PSYCHIATRY & NEUROLOGY

## 2024-03-04 PROCEDURE — 1100000000 HC RM PRIVATE

## 2024-03-04 RX ORDER — TRAZODONE HYDROCHLORIDE 50 MG/1
50 TABLET ORAL NIGHTLY
Status: DISCONTINUED | OUTPATIENT
Start: 2024-03-05 | End: 2024-03-07 | Stop reason: HOSPADM

## 2024-03-04 RX ORDER — ESCITALOPRAM OXALATE 10 MG/1
10 TABLET ORAL DAILY
Status: DISCONTINUED | OUTPATIENT
Start: 2024-03-05 | End: 2024-03-06

## 2024-03-04 RX ADMIN — ACETAMINOPHEN 650 MG: 325 TABLET ORAL at 09:10

## 2024-03-04 RX ADMIN — PANTOPRAZOLE SODIUM 40 MG: 40 TABLET, DELAYED RELEASE ORAL at 06:50

## 2024-03-04 RX ADMIN — ESCITALOPRAM OXALATE 5 MG: 10 TABLET ORAL at 09:10

## 2024-03-04 RX ADMIN — BICTEGRAVIR SODIUM, EMTRICITABINE, AND TENOFOVIR ALAFENAMIDE FUMARATE 1 TABLET: 50; 200; 25 TABLET ORAL at 09:13

## 2024-03-04 RX ADMIN — AMLODIPINE BESYLATE 5 MG: 5 TABLET ORAL at 09:13

## 2024-03-04 RX ADMIN — IBUPROFEN 600 MG: 600 TABLET, FILM COATED ORAL at 22:25

## 2024-03-04 RX ADMIN — LISINOPRIL 10 MG: 10 TABLET ORAL at 09:13

## 2024-03-04 RX ADMIN — HYDROXYZINE HYDROCHLORIDE 50 MG: 50 TABLET, FILM COATED ORAL at 22:25

## 2024-03-04 ASSESSMENT — PAIN DESCRIPTION - LOCATION: LOCATION: HAND

## 2024-03-04 ASSESSMENT — PAIN SCALES - GENERAL
PAINLEVEL_OUTOF10: 8
PAINLEVEL_OUTOF10: 5

## 2024-03-04 NOTE — BH NOTE
Patient bright, smiling affect, calm, cooperative and friendly. He was in day room watching tv and socializing before bed, ate snack and soda. He received ice for the edema in his right hand. Pain rated at 5/10 and was given ibuprofen, also requested trazodone for sleep and atarax for anxiety. Rated his depression 6/10, anxiety 10/10, and denies SI, HI and hallucinations. Pt has been sleeping well tonight with no signs of distress noted, respirations regular and unlabored. Will continue to monitor patient closely every 15 mins as per unit protocol.

## 2024-03-04 NOTE — PROGRESS NOTES
Vitals:    03/04/24 0910   BP: (!) 124/95   Pulse:    Resp:    Temp:    SpO2:        Current Facility-Administered Medications:     amLODIPine (NORVASC) tablet 5 mg, 5 mg, Oral, Daily, Tejal Dow MD, 5 mg at 03/04/24 0913    bictegravir-emtricitab-tenofovir alafenamide (BIKTARVY) -25 MG per tablet 1 tablet, 1 tablet, Oral, Daily, Tejal Dow MD, 1 tablet at 03/04/24 0913    escitalopram (LEXAPRO) tablet 5 mg, 5 mg, Oral, Daily, Tejal Dow MD, 5 mg at 03/04/24 0910    fluticasone (FLOVENT HFA) 220 MCG/ACT inhaler 2 puff, 2 puff, Inhalation, BID PRN, Tejal Dow MD    lisinopril (PRINIVIL;ZESTRIL) tablet 10 mg, 10 mg, Oral, Daily, Tejal Dow MD, 10 mg at 03/04/24 0913    albuterol sulfate HFA (PROVENTIL;VENTOLIN;PROAIR) 108 (90 Base) MCG/ACT inhaler 2 puff, 2 puff, Inhalation, Q6H PRN, Tejal Dow MD    traZODone (DESYREL) tablet 50 mg, 50 mg, Oral, Nightly PRN, Tejal Dow MD, 50 mg at 03/03/24 2104    pantoprazole (PROTONIX) tablet 40 mg, 40 mg, Oral, QAM AC, Tejal Dow MD, 40 mg at 03/04/24 0650    ibuprofen (ADVIL;MOTRIN) tablet 600 mg, 600 mg, Oral, Q8H PRN, Tejal Dow MD, 600 mg at 03/03/24 2104    hydrOXYzine HCl (ATARAX) tablet 50 mg, 50 mg, Oral, TID PRN, Tejal Dow MD, 50 mg at 03/03/24 2104    aluminum & magnesium hydroxide-simethicone (MAALOX) 200-200-20 MG/5ML suspension 30 mL, 30 mL, Oral, Q6H PRN, Tejal Dow MD    magnesium hydroxide (MILK OF MAGNESIA) 400 MG/5ML suspension 30 mL, 30 mL, Oral, Daily PRN, Tejal Dow MD    acetaminophen (TYLENOL) tablet 650 mg, 650 mg, Oral, Q4H PRN, Tejal Dow MD, 650 mg at 03/04/24 0910  Patient Active Problem List   Diagnosis    Unspecified mood (affective) disorder (HCC)    Depression

## 2024-03-04 NOTE — CARE COORDINATION
03/04/24 1458   ITP   Date of Plan 03/04/24   Date of Next Review 03/11/24   Primary Diagnosis Code Depression F32.A   Barriers to Treatment Client resistance   Strengths Incorporated in Plan Seeking interactions   Plan of Care   Long Term Goal (LTG) Stated in patient/guardian terms \"To get help that can help me sustain my independence. I'm self-employed.\"   Short Term Goal 1   Short Term Goal 1 Client will reduce frequency and intensity of unsafe behavior   Baseline Functioning Pt is using substances that induce SI   Target Pt will consider inpatient substance abuse treatment   Objectives Client will participate in group therapy   Intervention 1 Group therapy   Frequency daily   Measured by Behavioral data;Self report;Staff observation   Staff Responsible [facility] staff;Clinical staff   Intervention 2 Assess safety   Frequency Daily   Measured by Behavioral data;Self report;Staff observation   Staff Responsible [facility] staff;Clinical staff   Intervention 3 Acknowledge client strengths   Frequency Daily   Measured by Behavioral data;Self report;Staff observation   Staff Responsible [facility] staff;Clinical staff   STG Goal 1 Status: Patient Appears to be  Partially meeting treatment plan goal   Short Term Goal 2   Short Term Goal 2 Client will maintain compliance with medication regime   Baseline Functioning Pt did not follow through with medication management after last discharge in July 2023   Target Pt will acknowledge the importance of taking medication as prescribed.   Objectives Client will participate in group therapy   Intervention 1 Monitor medications   Frequency Daily   Measured by Behavioral data;Self report;Staff observation   Staff Responsible [facility] staff;Clinical staff   Intervention 2 Milieu therapy and support   Frequency Daily   Measured by Behavioral data;Self report;Staff observation   Staff Responsible [facility] staff;Clinical staff   Intervention 3 Acknowledge client strengths   Frequency Daily   Measured by  Behavioral data;Self report;Staff observation   Staff Responsible [facility] staff;Clinical staff   STG Goal 2 Status: Patient Appears to be  Progressing toward treatment plan goal   Crisis/Safety/Discharge Plan   Crisis/Safety Plan Standard program interventions and protocol   Comprehensive Assessment Completion Date 03/04/24   Discharge Plan Stabilize and refer to substance abuse programs

## 2024-03-04 NOTE — PROGRESS NOTES
Pt Dylon Willard actively participated in [facility] today 3/4/24 on [facility].    Please contact Spiritual Health with any emotional/spiritual needs or referrals. Thank you.    Perez Cam, Chaplain Resident, [facility]
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
## 2024-03-04 NOTE — GROUP NOTE
Group Therapy Note    Date: 3/4/2024    Group Start Time: 1335  Group End Time: 1420  Group Topic: Recreational    SSR 2  NON ACUTE    Valeria Dutta        Group Therapy Note    Facilitated leisure skills group to reinforce positive coping and to manage mood through music, social interaction, group activities and art task     Attendees: 5/10       Patient's Goal:  Client will reduce frequency and intensity of unsafe behavior    Notes:  Pt was receptive to listening to music and songs he selected while working on leisure task. Interacted with staff      Status After Intervention:  Improved    Participation Level: Active Listener and Interactive    Participation Quality: Appropriate and Attentive      Speech:  normal      Thought Process/Content: Logical      Affective Functioning: Congruent      Mood:  Calm      Level of consciousness:  Attentive      Response to Learning: Progressing to goal      Endings: None Reported    Modes of Intervention: Socialization and Activity      Discipline Responsible: Recreational Therapist      Signature:  LARRY Grant
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
## 2024-03-04 NOTE — GROUP NOTE
Group Therapy Note    Date: 3/4/2024    Group Start Time: 0940  Group End Time: 1030  Group Topic: Education Group - Inpatient    SSR 2  NON ACUTE    Valeria Dutta        Group Therapy Note    Facilitated discussion focused on defining and sharing examples of different types of automatic negative thoughts and how they affect moods and behaviors      Attendees: 7/10      Notes:  Encouraged but did not attend    Discipline Responsible: Recreational Therapist      Signature:  LARRY Grant

## 2024-03-04 NOTE — BH NOTE
DAY SHIFT    Pt up ad rigoberto on unit. Pt presents with flat affect. Pt reports depression and anxiety a 7/10 denies needing any medication for anxiety. Pt takes medication without difficulty. Pt denies SI/HI. Pt denies AVH. Pt is calm and cooperative through this shift. Pt receives PRN tylenol for hand pain. Close observations continued to ensure pt safety.

## 2024-03-04 NOTE — GROUP NOTE
Group Therapy Note    Date: 3/4/2024    Group Start Time: 1120  Group End Time: 1200  Group Topic: Process Group - Inpatient    SSR 2 [facility]    Edilia Morris        Group Therapy Note  Process group was focused on attachment styles. Writer asked the pts to describe their mood with a color and why that color. Writer provided that pts with a worksheet on different types of attachments and how to strengthen the attachments. Some of the pts appeared to be withdrawn and not sharing, while others were interested and providing feedback to others.   Attendees: 6-8      Notes:  Pt was encouraged to attend group and chose not to         Signature:  Edilia Morris

## 2024-03-05 ENCOUNTER — APPOINTMENT (OUTPATIENT)
Facility: HOSPITAL | Age: 54
DRG: 885 | End: 2024-03-05
Attending: INTERNAL MEDICINE

## 2024-03-05 PROCEDURE — 1240000000 HC EMOTIONAL WELLNESS R&B

## 2024-03-05 PROCEDURE — 6370000000 HC RX 637 (ALT 250 FOR IP): Performed by: PSYCHIATRY & NEUROLOGY

## 2024-03-05 RX ADMIN — PANTOPRAZOLE SODIUM 40 MG: 40 TABLET, DELAYED RELEASE ORAL at 06:45

## 2024-03-05 RX ADMIN — AMLODIPINE BESYLATE 5 MG: 5 TABLET ORAL at 08:18

## 2024-03-05 RX ADMIN — LISINOPRIL 10 MG: 10 TABLET ORAL at 08:18

## 2024-03-05 RX ADMIN — TRAZODONE HYDROCHLORIDE 50 MG: 50 TABLET ORAL at 21:35

## 2024-03-05 RX ADMIN — IBUPROFEN 600 MG: 600 TABLET, FILM COATED ORAL at 21:40

## 2024-03-05 RX ADMIN — ACETAMINOPHEN 650 MG: 325 TABLET ORAL at 08:18

## 2024-03-05 RX ADMIN — ESCITALOPRAM OXALATE 10 MG: 10 TABLET ORAL at 08:18

## 2024-03-05 RX ADMIN — BICTEGRAVIR SODIUM, EMTRICITABINE, AND TENOFOVIR ALAFENAMIDE FUMARATE 1 TABLET: 50; 200; 25 TABLET ORAL at 08:18

## 2024-03-05 ASSESSMENT — PAIN DESCRIPTION - DESCRIPTORS: DESCRIPTORS: THROBBING

## 2024-03-05 ASSESSMENT — PAIN DESCRIPTION - ORIENTATION: ORIENTATION: RIGHT

## 2024-03-05 ASSESSMENT — PAIN DESCRIPTION - LOCATION: LOCATION: HAND

## 2024-03-05 ASSESSMENT — PAIN SCALES - GENERAL: PAINLEVEL_OUTOF10: 7
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
## 2024-03-05 NOTE — BH NOTE
Pt noted up on unit walking in hallways and sitting in dayroom at times watching TV, pt was up for snacks, denies any SI/HI,AH/VH, presented with a flat affect, compliant with medications, requesting prn for sleep ant Motrin for pain in hand. Rt hand noted to be swollen, pt rates depression and anxiety 3/10, remains on close observation for safety.
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
## 2024-03-05 NOTE — GROUP NOTE
Group Therapy Note    Date: 3/5/2024    Group Start Time: 1100  Group End Time: 1130  Group Topic: Process Group - Inpatient    SSR 2 [facility]    Jeronimo Kitchen        Group Therapy Note: This writer facilitated a group where \"goal exploration\" was discussed and each individual provided feedback in the form of sharing their short term and long term goals.     Attendees: 6       Patient's Goal:  to attend groups    Notes:  Pt was encouraged to attend and did not.     Signature:  Jeronimo Kitchen

## 2024-03-05 NOTE — BH NOTE
DAY SHIFT    Pt up ad rigoberto on unit. Pt isolative to room and stays in bed majority if the shift. Pt calm and cooperative. Pt takes medication without difficulty and received PRN tylenol due to hand pain, medication effective. Pt denies depression and anxiety thi morning stating he feels \"a lot better\". Pt denies SI/HI. Pt denies AVH. Right hand continued to be swollen. Close observations continued to ensure pt safety.

## 2024-03-05 NOTE — BH NOTE
Behavioral Health Treatment Team Note     Patient goal(s) for today: \"To get up\"  Treatment team focus/goals: Medication management, group therapy, discharge planning    Progress note: Pt presents calm and cooperative with pleasant demeanor. He denies SI/HI/AVH and reports feeling \"better\". Pt rates anxiety 3/10 and depression 5/10. Pt states what's helping is not being in the same situation he was in last admission and feels hopeful and the meds are helping. Pt would like to step down to crisis stabilization with [facility]. They are currently reviewing his clinicals. An inpatient level of care is needed to coordinate a safe discharge plan.     LOS:  3  Expected LOS: 5-7 days    [facility]  Date of last family contact:  none authorized  Family requesting physician contact today:  No  Discharge plan:  Step down to crisis stabilization   Guns in the home:  No  Outpatient provider(s):  [facility]    Participating treatment team members: DEQUAN Menchaca MSW

## 2024-03-05 NOTE — PROGRESS NOTES
Vitals:    03/05/24 0818   BP: (!) 142/89   Pulse:    Resp:    Temp:    SpO2:        Current Facility-Administered Medications:     traZODone (DESYREL) tablet 50 mg, 50 mg, Oral, Nightly, Tejal Dow MD    escitalopram (LEXAPRO) tablet 10 mg, 10 mg, Oral, Daily, Tejal Dow MD, 10 mg at 03/05/24 0818    amLODIPine (NORVASC) tablet 5 mg, 5 mg, Oral, Daily, Tejal Dow MD, 5 mg at 03/05/24 0818    bictegravir-emtricitab-tenofovir alafenamide (BIKTARVY) -25 MG per tablet 1 tablet, 1 tablet, Oral, Daily, Tejal Dow MD, 1 tablet at 03/05/24 0818    fluticasone (FLOVENT HFA) 220 MCG/ACT inhaler 2 puff, 2 puff, Inhalation, BID PRN, Tejal Dow MD    lisinopril (PRINIVIL;ZESTRIL) tablet 10 mg, 10 mg, Oral, Daily, Tejal Dow MD, 10 mg at 03/05/24 0818    albuterol sulfate HFA (PROVENTIL;VENTOLIN;PROAIR) 108 (90 Base) MCG/ACT inhaler 2 puff, 2 puff, Inhalation, Q6H PRN, Tejal Dow MD    pantoprazole (PROTONIX) tablet 40 mg, 40 mg, Oral, QAM AC, Tejal Dow MD, 40 mg at 03/05/24 0645    ibuprofen (ADVIL;MOTRIN) tablet 600 mg, 600 mg, Oral, Q8H PRN, Tejal Dow MD, 600 mg at 03/04/24 2225    hydrOXYzine HCl (ATARAX) tablet 50 mg, 50 mg, Oral, TID PRN, Tejal Dow MD, 50 mg at 03/04/24 2225    aluminum & magnesium hydroxide-simethicone (MAALOX) 200-200-20 MG/5ML suspension 30 mL, 30 mL, Oral, Q6H PRN, Tejal Dow MD    magnesium hydroxide (MILK OF MAGNESIA) 400 MG/5ML suspension 30 mL, 30 mL, Oral, Daily PRN, Tejal Dow MD    acetaminophen (TYLENOL) tablet 650 mg, 650 mg, Oral, Q4H PRN, Tejal Dow MD, 650 mg at 03/05/24 0818  Patient Active Problem List   Diagnosis    Unspecified mood (affective) disorder (HCC)    Depression

## 2024-03-06 ENCOUNTER — APPOINTMENT (OUTPATIENT)
Facility: HOSPITAL | Age: 54
DRG: 885 | End: 2024-03-06
Attending: INTERNAL MEDICINE

## 2024-03-06 LAB
ECHO AV MEAN GRADIENT: 5 MMHG
ECHO AV MEAN VELOCITY: 1 M/S
ECHO AV PEAK GRADIENT: 9 MMHG
ECHO AV PEAK VELOCITY: 1.5 M/S
ECHO AV VELOCITY RATIO: 0.67
ECHO AV VTI: 23.2 CM
ECHO BSA: 1.9 M2
ECHO LVOT AV VTI INDEX: 0.69
ECHO LVOT MEAN GRADIENT: 1 MMHG
ECHO LVOT PEAK GRADIENT: 4 MMHG
ECHO LVOT PEAK VELOCITY: 1 M/S
ECHO LVOT VTI: 16 CM
ECHO MV A VELOCITY: 0.52 M/S
ECHO MV E DECELERATION TIME (DT): 430 MS
ECHO MV E VELOCITY: 0.39 M/S
ECHO MV E/A RATIO: 0.75

## 2024-03-06 PROCEDURE — 93306 TTE W/DOPPLER COMPLETE: CPT

## 2024-03-06 PROCEDURE — 1240000000 HC EMOTIONAL WELLNESS R&B

## 2024-03-06 PROCEDURE — 6370000000 HC RX 637 (ALT 250 FOR IP): Performed by: PSYCHIATRY & NEUROLOGY

## 2024-03-06 RX ORDER — ESCITALOPRAM OXALATE 10 MG/1
20 TABLET ORAL DAILY
Status: DISCONTINUED | OUTPATIENT
Start: 2024-03-07 | End: 2024-03-07 | Stop reason: HOSPADM

## 2024-03-06 RX ADMIN — BICTEGRAVIR SODIUM, EMTRICITABINE, AND TENOFOVIR ALAFENAMIDE FUMARATE 1 TABLET: 50; 200; 25 TABLET ORAL at 08:20

## 2024-03-06 RX ADMIN — AMLODIPINE BESYLATE 5 MG: 5 TABLET ORAL at 08:20

## 2024-03-06 RX ADMIN — ESCITALOPRAM OXALATE 10 MG: 10 TABLET ORAL at 08:20

## 2024-03-06 RX ADMIN — PANTOPRAZOLE SODIUM 40 MG: 40 TABLET, DELAYED RELEASE ORAL at 06:38

## 2024-03-06 RX ADMIN — TRAZODONE HYDROCHLORIDE 50 MG: 50 TABLET ORAL at 21:13

## 2024-03-06 RX ADMIN — LISINOPRIL 10 MG: 10 TABLET ORAL at 08:20

## 2024-03-06 RX ADMIN — IBUPROFEN 600 MG: 600 TABLET, FILM COATED ORAL at 08:20

## 2024-03-06 ASSESSMENT — PAIN DESCRIPTION - LOCATION: LOCATION: HAND

## 2024-03-06 ASSESSMENT — PAIN DESCRIPTION - ORIENTATION: ORIENTATION: RIGHT

## 2024-03-06 NOTE — BH NOTE
Patient medication compliant, pleasant and cooperative, endorsed anxiety at a 4 and depression at a 3 on a scale of 0-10.  Affect broad, c/o right hand pain, and received ibuprofen.  Patient requesting to have a doctor talk to him about his hand, Dr. Santos paged and texted to come talk to patient .  Patient denied SI, HI, AH, and VH.  Patient remains on close observation, Q 15 minute checks.

## 2024-03-06 NOTE — BH NOTE
The pt has a brighter affect and was in the milieu watching T.V. The pt rated his anxiety a 4/10 and his depression a 3/10. Pt denies having any A/D/SI/HI or A/VH. He has been pleasant and cooperative. The pt accepted snacks and something to drink. He is concerned about his hand. Pt c/o right hand pain that is throbbing and the discomfort was rated a 7/10. Pt received prn Motrin at 2140. The pt received scheduled Trazodone. The pt remains A+O and ambulates independently.      The pt has rested quietly in bed with his eyes closed. No distress noted or voiced. Respirations are quiet and unlabored. Close observation maintained for safety.

## 2024-03-06 NOTE — PROGRESS NOTES
Progress Note  Date:3/6/2024       Room:[facility]  Patient Name:Dylon Willard     YOB: 1970     Age:53 y.o.        Subjective    Subjective patient was seen in the group room with the team. The patient is smiley and conversational. He is doing fine but continues to inquire about his right hand/wrist soft issue injury. The swelling is present but Xray was negative for acute processes. The patient does not report out-of-proportion pain on the wrist.    He is interested in making [place] as his primary follow up care. He reports his depression is rated as 2/10 and states that \"time\" is helping with depression. He has recently lost his father. He denies having any active SI HI.  Has been compliant with his medications.  Tolerating his increased Lexapro.  We will continue to further titrate his antidepressants  Mental status examination-patient dressed in green gown.  Limited interactions with peers or staff.  No active SI HI and no command hallucinations voiced.  Review of Systems  Objective         Vitals Last 24 Hours:  TEMPERATURE:  Temp  Av.9 °F (36.6 °C)  Min: 97.5 °F (36.4 °C)  Max: 98.2 °F (36.8 °C)  RESPIRATIONS RANGE: Resp  Av  Min: 18  Max: 18  PULSE OXIMETRY RANGE: SpO2  Av %  Min: 99 %  Max: 99 %  PULSE RANGE: Pulse  Av  Min: 73  Max: 81  BLOOD PRESSURE RANGE: Systolic (24hrs), Av , Min:106 , Max:141   ; Diastolic (24hrs), Av, Min:56, Max:96    I/O (24Hr):  No intake or output data in the 24 hours ending 24 1020  Objective:  Vital signs: (most recent): Blood pressure (!) 141/96, pulse 73, temperature 97.5 °F (36.4 °C), temperature source Oral, resp. rate 18, height 1.727 m (5' 8\"), weight 75.3 kg (166 lb), SpO2 99 %.      Labs/Imaging/Diagnostics    Labs:  CBC:No results for input(s): \"WBC\", \"RBC\", \"HGB\", \"HCT\", \"MCV\", \"RDW\", \"PLT\" in the last 72 hours.  CHEMISTRIES:No results for input(s): \"NA\", \"K\", \"CL\", \"CO2\", \"BUN\", \"CREATININE\", \"GLUCOSE\", \"CA\", \"PHOS\", \"MG\"

## 2024-03-06 NOTE — PROGRESS NOTES
Progress Note  Date:3/5/2024       Room:[facility]  Patient Name:Dylon Willard     YOB: 1970     Age:53 y.o.        Subjective    Subjective patient has been mostly in bed mostly isolated to himself.  He reports his depression has been better.  He denies having any active SI HI.  Has been compliant with his medications.  Tolerating his increased Lexapro.  We will continue to further titrate his antidepressants  Mental status examination-patient casually dressed isolated to himself.  Limited interactions with peers or staff.  No active SI HI and no command hallucinations voiced.  Review of Systems  Objective         Vitals Last 24 Hours:  TEMPERATURE:  Temp  Av °F (36.7 °C)  Min: 98 °F (36.7 °C)  Max: 98 °F (36.7 °C)  RESPIRATIONS RANGE: Resp  Av.5  Min: 18  Max: 19  PULSE OXIMETRY RANGE: SpO2  Av.5 %  Min: 98 %  Max: 99 %  PULSE RANGE: Pulse  Av  Min: 77  Max: 77  BLOOD PRESSURE RANGE: Systolic (24hrs), Av , Min:142 , Max:142   ; Diastolic (24hrs), Av, Min:89, Max:89    I/O (24Hr):  No intake or output data in the 24 hours ending 24  Objective  Labs/Imaging/Diagnostics    Labs:  CBC:No results for input(s): \"WBC\", \"RBC\", \"HGB\", \"HCT\", \"MCV\", \"RDW\", \"PLT\" in the last 72 hours.  CHEMISTRIES:No results for input(s): \"NA\", \"K\", \"CL\", \"CO2\", \"BUN\", \"CREATININE\", \"GLUCOSE\", \"CA\", \"PHOS\", \"MG\" in the last 72 hours.  PT/INR:No results for input(s): \"PROTIME\", \"INR\" in the last 72 hours.  APTT:No results for input(s): \"APTT\" in the last 72 hours.  LIVER PROFILE:No results for input(s): \"AST\", \"ALT\", \"BILIDIR\", \"BILITOT\", \"ALKPHOS\" in the last 72 hours.    Imaging Last 24 Hours:  No results found.  Assessment//Plan           Hospital Problems             Last Modified POA    * (Principal) Unspecified mood (affective) disorder (HCC) 3/2/2024 Yes     Assessment & Plan  Increase Lexapro to 20 mg p.o. daily for underlying depression and anxiety.  Encourage participation in

## 2024-03-06 NOTE — GROUP NOTE
Group Therapy Note    Date: 3/6/2024    Group Start Time: 1115  Group End Time: 1200  Group Topic: Process Group - Inpatient    SSR 2 [facility] ACUTE    Nadia Rahman MSW        Group Therapy Note: Facilitator encouraged group participants to process thoughts and feelings. Group members shared mental health concerns and discussed ways to manage stressors in a healthy way.     Attendees: 7       Patient's Goal:  Pt did not attend group.      Signature:  REHANA POWELL

## 2024-03-06 NOTE — PLAN OF CARE
Problem: Anxiety  Goal: Will report anxiety at manageable levels  Description: INTERVENTIONS:  1. Administer medication as ordered  2. Teach and rehearse alternative coping skills  3. Provide emotional support with 1:1 interaction with staff  Outcome: Progressing     Problem: Depression/Self Harm  Goal: Effect of psychiatric condition will be minimized and patient will be protected from self harm  Description: INTERVENTIONS:  1. Assess impact of patient's symptoms on level of functioning, self care needs and offer support as indicated  2. Assess patient/family knowledge of depression, impact on illness and need for teaching  3. Provide emotional support, presence and reassurance  4. Assess for possible suicidal thoughts or ideation. If patient expresses suicidal thoughts or statements do not leave alone, initiate Suicide Precautions, move to a room close to the nursing station and obtain sitter  5. Initiate consults as appropriate with Mental Health Professional, Spiritual Care, Psychosocial CNS, and consider a recommendation to the LIP for a Psychiatric Consultation  Outcome: Progressing     Problem: Drug Abuse/Detox  Goal: Will have no detox symptoms and will verbalize plan for changing drug-related behavior  Description: INTERVENTIONS:  1. Administer medication as ordered  2. Monitor physical status  3. Provide emotional support with 1:1 interaction with staff  4. Encourage  recovery focused treatment   Outcome: Progressing    -pt denies having any anxiety; pt has been calm and cooperative.  -pt denies having any suicidal thoughts; no gestures noted.  -no withdrawal symptoms noted or reported.

## 2024-03-06 NOTE — BH NOTE
Behavioral Health Treatment Team Note     Patient goal(s) for today: To stay positive  Treatment team focus/goals: Medication management, group therapy, discharge planning    Progress note: Pt presents calm and cooperative, inquiring about his discharge plan. He denies SI/HI/AVH and reports depression low 2/10. Pt is looking forward to stepping down to crisis with \"[facility]\". He has been in service with them before and they have accepted him back tomorrow. An inpatient level of care is still needed to coordinate a safe discharge plan.     LOS:  4  Expected LOS: 5-7 days    Insurance info/prescription coverage:  [employer]  Date of last family contact:  none authorized  Family requesting physician contact today:  No  Discharge plan:  Step down to crisis stabilization   Guns in the home:  No  Outpatient provider(s):  [facility]     Participating treatment team members: DEQUAN Menchaca MSW

## 2024-03-06 NOTE — GROUP NOTE
Group Therapy Note    Date: 3/5/2024    Group Start Time: 1845  Group End Time: 1930  Group Topic: Recreational    SSR 2 BH NON ACUTE    Sidra Woodward        Group Therapy Note    Attendees: 10/11    Recreational Therapist facilitated structured leisure skills group to introduce healthy leisure skills as positive way to cope and manage mood.           Patient's Goal:  Client will reduce frequency and intensity of unsafe behavior     Notes:  Attended group and listened to songs with peers.  Pt was receptive to intervention and responded to prompts from staff.     Status After Intervention:  Improved    Participation Level: Minimal    Participation Quality: Appropriate      Speech:  normal      Thought Process/Content: Logical      Affective Functioning: Congruent      Mood:  calm       Level of consciousness:  Alert      Response to Learning: Progressing to goal      Endings: None Reported    Modes of Intervention: Activity      Discipline Responsible: Recreational Therapist      Signature:  LARRY Shelley

## 2024-03-07 VITALS
SYSTOLIC BLOOD PRESSURE: 133 MMHG | HEIGHT: 68 IN | RESPIRATION RATE: 18 BRPM | TEMPERATURE: 97.9 F | OXYGEN SATURATION: 100 % | HEART RATE: 53 BPM | WEIGHT: 166 LBS | BODY MASS INDEX: 25.16 KG/M2 | DIASTOLIC BLOOD PRESSURE: 72 MMHG

## 2024-03-07 PROCEDURE — 6370000000 HC RX 637 (ALT 250 FOR IP): Performed by: PSYCHIATRY & NEUROLOGY

## 2024-03-07 RX ORDER — FLUTICASONE PROPIONATE 220 UG/1
2 AEROSOL, METERED RESPIRATORY (INHALATION) 2 TIMES DAILY PRN
Qty: 1 EACH | Refills: 0 | Status: SHIPPED | OUTPATIENT
Start: 2024-03-07

## 2024-03-07 RX ORDER — AMLODIPINE BESYLATE 5 MG/1
5 TABLET ORAL DAILY
Qty: 30 TABLET | Refills: 1 | Status: SHIPPED | OUTPATIENT
Start: 2024-03-07

## 2024-03-07 RX ORDER — LISINOPRIL 10 MG/1
10 TABLET ORAL DAILY
Qty: 30 TABLET | Refills: 3 | Status: SHIPPED | OUTPATIENT
Start: 2024-03-07

## 2024-03-07 RX ORDER — ESCITALOPRAM OXALATE 20 MG/1
20 TABLET ORAL DAILY
Qty: 30 TABLET | Refills: 3 | Status: SHIPPED | OUTPATIENT
Start: 2024-03-08

## 2024-03-07 RX ORDER — ALBUTEROL SULFATE 90 UG/1
2 AEROSOL, METERED RESPIRATORY (INHALATION) EVERY 6 HOURS PRN
Qty: 18 G | Refills: 1 | Status: SHIPPED | OUTPATIENT
Start: 2024-03-07

## 2024-03-07 RX ORDER — IBUPROFEN 600 MG/1
600 TABLET ORAL EVERY 8 HOURS PRN
Qty: 30 TABLET | Refills: 0 | Status: SHIPPED | OUTPATIENT
Start: 2024-03-07

## 2024-03-07 RX ORDER — TRAZODONE HYDROCHLORIDE 50 MG/1
50 TABLET ORAL NIGHTLY
Qty: 30 TABLET | Refills: 1 | Status: SHIPPED | OUTPATIENT
Start: 2024-03-07

## 2024-03-07 RX ORDER — HYDROXYZINE 50 MG/1
50 TABLET, FILM COATED ORAL 3 TIMES DAILY PRN
Qty: 30 TABLET | Refills: 1 | Status: SHIPPED | OUTPATIENT
Start: 2024-03-07 | End: 2024-03-27

## 2024-03-07 RX ADMIN — AMLODIPINE BESYLATE 5 MG: 5 TABLET ORAL at 08:47

## 2024-03-07 RX ADMIN — BICTEGRAVIR SODIUM, EMTRICITABINE, AND TENOFOVIR ALAFENAMIDE FUMARATE 1 TABLET: 50; 200; 25 TABLET ORAL at 08:46

## 2024-03-07 RX ADMIN — ESCITALOPRAM OXALATE 20 MG: 10 TABLET ORAL at 08:46

## 2024-03-07 RX ADMIN — PANTOPRAZOLE SODIUM 40 MG: 40 TABLET, DELAYED RELEASE ORAL at 06:51

## 2024-03-07 RX ADMIN — LISINOPRIL 10 MG: 10 TABLET ORAL at 08:46

## 2024-03-07 NOTE — BH NOTE
DISCHARGE SUMMARY    NAME:Dylon Willard  : 1970  MRN: [id number]    The patient Dylon Willard exhibits the ability to control behavior in a less restrictive environment.  Patient's level of functioning is improving.  No assaultive/destructive behavior has been observed for the past 24 hours.  No suicidal/homicidal threat or behavior has been observed for the past 24 hours.  There is no evidence of serious medication side effects.  Patient has not been in physical or protective restraints for at least the past 24 hours.    If weapons involved, how are they secured? N/a    Is patient aware of and in agreement with discharge plan? Yes    Arrangements for medication:  Prescriptions to pharmacy in chart    Copy of discharge instructions to provider?:  Yes    Arrangements for transportation home:  Cab to crisis location.    Keep all follow up appointments as scheduled, continue to take prescribed medications per physician instructions.  Mental health crisis number:  [phone]      Mental Health Emergency WARM LINE      [phone] ([phone])      M-F: 9am to 9pm      Sat & Sun: 5pm - 9pm  National suicide prevention lines:                             8-750-LPRLFAA ([phone])       [phone] ([phone])    Crisis Text Line:  Text HOME to [phone]

## 2024-03-07 NOTE — GROUP NOTE
Group Therapy Note    Date: 3/7/2024    Group Start Time: 1115  Group End Time: 1200  Group Topic: Process Group - Inpatient    [facility]R 2 [facility]    Edilia Morris        Group Therapy Note  Process group was focused on what brought them to the hospital, the coping skills that they have learned and what they will continue to work on when they leave the hospital. Writer used the mood chart as an ice breaker and asked each pt to share what zone they are in and why. Pt interacted well with other another and when group turned negative writer was able to redirect the pts easily   Attendees: 7-12       Patient's Goal:  \"to love myself first\"    Notes:  Pt interacted well with others and had good insight on why he was in the hospital. Pt shared that when he was in the ED and they asked SI/HI it brought him back to July when he was admitted the last time. He shared that he is in a toxic relationship but that he does not want to get out of it. Pt shared that he has started to place healthy boundaries and that has distanced himself from her    Status After Intervention:  Improved    Participation Level: Active Listener and Interactive    Participation Quality: Appropriate, Attentive, Sharing, and Supportive      Speech:  loud      Thought Process/Content: Logical  Linear      Affective Functioning: Congruent      Mood:  \"happy:      Level of consciousness:  Alert, Oriented x4, and Attentive      Response to Learning: Able to verbalize current knowledge/experience, Able to verbalize/acknowledge new learning, Able to retain information, Capable of insight, and Progressing to goal      Endings: None Reported    Modes of Intervention: Support, Socialization, Exploration, Problem-solving, and Limit-setting      Discipline Responsible: /Counselor      Signature:  Edilia Morris

## 2024-03-07 NOTE — PROGRESS NOTES
bleeding, and lymphadenopathy  Musculoskeletal:negative for neck pain, back pain, and muscle weakness  Neurological: negative for headaches, dizziness, and weakness  Behavioral/Psych: negative for anxiety and depression  Allergic/Immunologic: negative for urticaria and angioedema        Objective:     /69   Pulse (!) 49   Temp 98.7 °F (37.1 °C) (Oral)   Resp 18   Ht 1.727 m (5' 8\")   Wt 75.3 kg (166 lb)   SpO2 100%   BMI 25.24 kg/m²         Temp (24hrs), Av.1 °F (36.7 °C), Min:97.5 °F (36.4 °C), Max:98.7 °F (37.1 °C)      No intake/output data recorded.  No intake/output data recorded.    PHYSICAL EXAM:  Skin: Extremities and face reveal no rashes.   HEENT: Sclerae anicteric. Extra-occular muscles are intact. No oral ulcers. No ENT discharge. The neck is supple.   Cardiovascular: Regular rate and rhythm. No murmurs, gallops, or rubs. PMI nondisplaced.   Respiratory:  Clear breath sounds bilaterally with no wheezes, rales, or rhonchi.   GI: Abdomen nondistended, soft, and nontender. Normal active bowel sounds. No enlargement of the liver or spleen. No masses palpable.   Rectal: Deferred   Musculoskeletal: No pitting edema of the lower legs. Extremities have good range of motion. No costovertebral tenderness.  Right hand is swollen  Neurological:  Patient is alert and oriented. Cranial nerves II-XII grossly intact  Psychiatric: Mood appears appropriate with judgement intact.   Lymphatic: No cervical or supraclavicular adenopathy.    Additional comments:    Data Review    Recent Results (from the past 24 hour(s))   Echo (TTE) complete (PRN contrast/bubble/strain/3D)    Collection Time: 24 10:57 AM   Result Value Ref Range    AV Mean Gradient 5 mmHg    AV VTI 23.2 cm    AV Mean Velocity 1.0 m/s    AV Peak Velocity 1.5 m/s    AV Peak Gradient 9 mmHg    LVOT Mean Gradient 1 mmHg    LVOT VTI 16.0 cm    LVOT Peak Velocity 1.0 m/s    LVOT Peak Gradient 4 mmHg    MV E Wave Deceleration Time 430.0 ms

## 2024-03-07 NOTE — BH NOTE
Behavioral Health Transition Record to Provider    Patient Name: Dylon Willard  YOB: 1970  Medical Record Number: [id number]  Date of Admission: 3/2/2024  Date of Discharge: 3/7/2024    Attending Provider: Tejal Dow MD  Discharging Provider: Dr. Dow  To contact this individual call [phone] and ask the  to page.  If unavailable, ask to be transferred to Behavioral Health Provider on call.  A Behavioral Health Provider will be available on call 24/7 and during holidays.    Primary Care Provider: Ifeanyi Arriola PA    Allergies   Allergen Reactions    Levetiracetam      Flu-like symptoms: fever, headache, shakes, body aches.       Reason for Admission: Pt denies HI/AVH, however endorses passive SI and increasing depressing over the past couple of weeks.     Admission Diagnosis: Right hand pain [M79.641]  Suicidal ideations [R45.851]  Unspecified mood (affective) disorder (HCC) [F39]    * No surgery found *    Results for orders placed or performed during the hospital encounter of 03/02/24   Rapid influenza A/B antigens    Specimen: Nasal Washing   Result Value Ref Range    Influenza A Ag Negative Negative      Influenza B Ag Negative Negative     COVID-19, Rapid    Specimen: Nasopharyngeal   Result Value Ref Range    SARS-CoV-2, Rapid Not Detected Not Detected     COVID-19 & Influenza Combo    Specimen: Nasopharyngeal   Result Value Ref Range    SARS-CoV-2, PCR Not Detected Not Detected      Rapid Influenza A By PCR Not Detected Not Detected      Rapid Influenza B By PCR Not Detected Not Detected     MRSA by PCR    Specimen: Nares   Result Value Ref Range    MRSA by PCR DETECTED     CBC with Auto Differential   Result Value Ref Range    WBC 5.9 4.1 - 11.1 K/uL    RBC 4.40 4.10 - 5.70 M/uL    Hemoglobin 13.1 12.1 - 17.0 g/dL    Hematocrit 39.5 36.6 - 50.3 %    MCV 89.8 80.0 - 99.0 FL    MCH 29.8 26.0 - 34.0 PG    MCHC 33.2 30.0 - 36.5 g/dL    RDW 12.6 11.5 - 14.5 %    Platelets 185 150 - 400

## 2024-03-07 NOTE — GROUP NOTE
Group Therapy Note    Date: 3/7/2024    Group Start Time: 0935  Group End Time: 1020  Group Topic: Education Group - Inpatient    SSR 2  NON ACUTE    Valeria Dutta        Group Therapy Note    Facilitated discussion focused on defining different types of defense mechanisms and being able to recognize some defenses that was used to cope with stress and anxiety       Attendees: 5/10      Notes:  Encouraged but did not attend    Discipline Responsible: Recreational Therapist      Signature:  LARRY Grant

## 2024-03-07 NOTE — GROUP NOTE
Group Therapy Note    Date: 3/7/2024    Group Start Time: 1323  Group End Time: 1410  Group Topic: Recreational    SSR 2  NON ACUTE    Valeria Dutta        Group Therapy Note    Facilitated leisure skills group to reinforce positive coping and to manage mood through music, social interaction, group activities and art task       Attendees: 10/12       Patient's Goal:  Client will reduce frequency and intensity of unsafe behavior    Notes:  Pt was receptive to listening to music and a song he selected while working on leisure task. Interacted with peers and staff    Status After Intervention:  Improved    Participation Level: Active Listener and Interactive    Participation Quality: Appropriate and Attentive      Speech:  normal      Thought Process/Content: Logical      Affective Functioning: Congruent      Mood:  Calm      Level of consciousness:  Attentive      Response to Learning: Progressing to goal      Endings: None Reported    Modes of Intervention: Socialization and Activity      Discipline Responsible: Recreational Therapist      Signature:  LARRY Grant

## 2024-03-07 NOTE — BH NOTE
52yo admitted d/t SI.     Hx depression, [facility] 6/29/23, medication noncompliance, rehab, MRSA, arrhythmia, HIV, abscess, cellulitis, bronchitis, GERD. ALLERGIES: levetiracetam     VS P 49. Patient noted on the phone a lot. Presents polite, bright, appropriate, depression, anxiety, preoccupied. Compliant with bedtime snack & medications.     Maintain safety checks q15m.

## 2024-03-07 NOTE — PLAN OF CARE
Problem: Discharge Planning  Goal: Discharge to home or other facility with appropriate resources  Outcome: Progressing     Problem: Pain  Goal: Verbalizes/displays adequate comfort level or baseline comfort level  Outcome: Progressing     Problem: ABCDS Injury Assessment  Goal: Absence of physical injury  Outcome: Progressing     Problem: Anxiety  Goal: Will report anxiety at manageable levels  Description: INTERVENTIONS:  1. Administer medication as ordered  2. Teach and rehearse alternative coping skills  3. Provide emotional support with 1:1 interaction with staff  Outcome: Progressing     Problem: Coping  Goal: Pt/Family able to verbalize concerns and demonstrate effective coping strategies  Description: INTERVENTIONS:  1. Assist patient/family to identify coping skills, available support systems and cultural and spiritual values  2. Provide emotional support, including active listening and acknowledgement of concerns of patient and caregivers  3. Reduce environmental stimuli, as able  4. Instruct patient/family in relaxation techniques, as appropriate  5. Assess for spiritual pain/suffering and initiate Spiritual Care, Psychosocial Clinical Specialist consults as needed  Outcome: Progressing     Problem: Depression/Self Harm  Goal: Effect of psychiatric condition will be minimized and patient will be protected from self harm  Description: INTERVENTIONS:  1. Assess impact of patient's symptoms on level of functioning, self care needs and offer support as indicated  2. Assess patient/family knowledge of depression, impact on illness and need for teaching  3. Provide emotional support, presence and reassurance  4. Assess for possible suicidal thoughts or ideation. If patient expresses suicidal thoughts or statements do not leave alone, initiate Suicide Precautions, move to a room close to the nursing station and obtain sitter  5. Initiate consults as appropriate with Mental Health Professional, Spiritual Care,

## 2024-03-07 NOTE — DISCHARGE SUMMARY
PSYCHIATRIC DISCHARGE SUMMARY         IDENTIFICATION:    Patient Name  Dylon Willard   Date of Birth 1970   [facility] [id number]   Medical Record Number  [id number]      Age  53 y.o.   PCP Ifeanyi Arriola PA   Admit date:  3/2/2024    Discharge date: 3/7/2024   Room Number  246/01  @ [facility]   Date of Service  3/7/2024            TYPE OF DISCHARGE: REGULAR               CONDITION AT DISCHARGE: {condition:[id number]}       PROVISIONAL & DISCHARGE DIAGNOSES:      Problem List Items Addressed This Visit    None  Visit Diagnoses       Right hand pain    -  Primary    Suicidal ideations        SOB (shortness of breath)        Relevant Orders    Echo (TTE) complete (PRN contrast/bubble/strain/3D) (Completed)              CC & HISTORY OF PRESENT ILLNESS:         SOCIAL HISTORY:    Social History     Socioeconomic History    Marital status: Single     Spouse name: Not on file    Number of children: Not on file    Years of education: Not on file    Highest education level: Not on file   Occupational History    Not on file   Tobacco Use    Smoking status: Never    Smokeless tobacco: Never   Substance and Sexual Activity    Alcohol use: No    Drug use: No    Sexual activity: Defer   Other Topics Concern    Not on file   Social History Narrative    Not on file     Social Determinants of Health     Financial Resource Strain: Not on file   Food Insecurity: Not on file   Transportation Needs: Not on file   Physical Activity: Not on file   Stress: Not on file   Social Connections: Not on file   Intimate Partner Violence: Not on file   Housing Stability: Not on file      FAMILY HISTORY:   Family History   Problem Relation Age of Onset    Hypertension Mother              HOSPITALIZATION COURSE:    Dylon Willard was admitted to the inpatient psychiatric unit [facility] for acute psychiatric stabilization in regards to symptomatology as described in the HPI above.  While on the unit Dylon Willard was involved in  Defer   Other Topics Concern    Not on file   Social History Narrative    Not on file     Social Determinants of Health     Financial Resource Strain: Not on file   Food Insecurity: Not on file   Transportation Needs: Not on file   Physical Activity: Not on file   Stress: Not on file   Social Connections: Not on file   Intimate Partner Violence: Not on file   Housing Stability: Not on file      FAMILY HISTORY:   Family History   Problem Relation Age of Onset    Hypertension Mother              HOSPITALIZATION COURSE:    Dylon Willard was admitted to the inpatient psychiatric unit [facility] for acute psychiatric stabilization in regards to symptomatology as described in the HPI above.  While on the unit Dylon Willard was involved in individual, group, occupational and milieu therapy.  Psychiatric medications were adjusted during this hospitalization. Dylon Willard demonstrated a slow, but progressive improvement in overall condition. Much of patient's depression appeared to be related to situational stressors, effects of drugs of abuse, and psychological factors.  Please see individual progress notes for more specific details regarding patient's hospitalization course.     At time of discharge, Dylon Willard is without significant problems of depression, psychosis, stephan. Patient free of suicidal and homicidal ideations and reports many positive predictive factors in terms of not attempting suicide or homicide.Patient with request for discharge today.There are no grounds to seek a TDO.Patient has maximized benefit to be derived from acute inpatient psychiatric treatment.  All members of the treatment team concur with each other in regards to plans for discharge today per patient's request.  Patient and family are aware and in agreement with discharge and discharge plan.         LABS AND IMAGAING:    Labs Reviewed   COMPREHENSIVE METABOLIC PANEL - Abnormal; Notable for the following components:       Result Value

## 2024-03-07 NOTE — GROUP NOTE
Group Therapy Note    Date: 3/6/2024    Group Start Time: 1845  Group End Time: 1930  Group Topic: Recreational    SSR 2 BH NON ACUTE    Sidra Woodward        Group Therapy Note    Attendees: 8/9    Recreational Therapist facilitated structured leisure skills group to introduce healthy leisure skills as positive way to cope and manage mood.         Patient's Goal:  Client will reduce frequency and intensity of unsafe behavior     Notes:  Attended group and listened to songs with peers.  Pt was receptive to intervention and responded to prompts from staff.     Status After Intervention:  Improved    Participation Level: Active Listener    Participation Quality: Appropriate      Speech:  normal      Thought Process/Content: Logical      Affective Functioning: Congruent      Mood:  calm       Level of consciousness:  Alert      Response to Learning: Progressing to goal      Endings: None Reported    Modes of Intervention: Activity      Discipline Responsible: Recreational Therapist      Signature:  LARRY Shelley

## 2024-03-07 NOTE — PROGRESS NOTES
Hospitalist Progress Note  [facility]    Subjective:   Daily Progress Note: 3/7/2024 8:59 AM    Right hand swelling patient claims a lot of trauma at work he has a history of cocaine and amphetamine use    Current Facility-Administered Medications   Medication Dose Route Frequency    escitalopram (LEXAPRO) tablet 20 mg  20 mg Oral Daily    traZODone (DESYREL) tablet 50 mg  50 mg Oral Nightly    amLODIPine (NORVASC) tablet 5 mg  5 mg Oral Daily    bictegravir-emtricitab-tenofovir alafenamide (BIKTARVY) -25 MG per tablet 1 tablet  1 tablet Oral Daily    fluticasone (FLOVENT HFA) 220 MCG/ACT inhaler 2 puff  2 puff Inhalation BID PRN    lisinopril (PRINIVIL;ZESTRIL) tablet 10 mg  10 mg Oral Daily    albuterol sulfate HFA (PROVENTIL;VENTOLIN;PROAIR) 108 (90 Base) MCG/ACT inhaler 2 puff  2 puff Inhalation Q6H PRN    pantoprazole (PROTONIX) tablet 40 mg  40 mg Oral QAM AC    ibuprofen (ADVIL;MOTRIN) tablet 600 mg  600 mg Oral Q8H PRN    hydrOXYzine HCl (ATARAX) tablet 50 mg  50 mg Oral TID PRN    aluminum & magnesium hydroxide-simethicone (MAALOX) 200-200-20 MG/5ML suspension 30 mL  30 mL Oral Q6H PRN    magnesium hydroxide (MILK OF MAGNESIA) 400 MG/5ML suspension 30 mL  30 mL Oral Daily PRN    acetaminophen (TYLENOL) tablet 650 mg  650 mg Oral Q4H PRN        Review of Systems  Constitutional: negative for fevers, chills, sweats, and fatigue  Eyes: negative for redness and icterus  Ears, nose, mouth, throat, and face: negative for ear drainage, nasal congestion, sore throat, and voice change  Respiratory: negative for cough, wheezing, or dyspnea on exertion  Cardiovascular: negative for chest pain, dyspnea, palpitations, lower extremity edema  Gastrointestinal: negative for nausea, vomiting, diarrhea, and abdominal pain  Genitourinary:negative for frequency, dysuria, and hematuria  Integument/breast: negative for skin lesion(s) and dryness  Hematologic/lymphatic: negative for easy bruising,

## 2024-03-07 NOTE — BH NOTE
Patient discharged to crisis stabilization via taxi cab. Belongings sent with patient. Verbal/written discharge instructions explained & given to patient. Patient verbalized understanding. Calm & cooperative. Denies SI/HI. Denies AVH. No physical complaints voiced. Appetite & sleep pattern WNL's. Positive attitude toward discharge.

## 2024-03-25 ENCOUNTER — HOSPITAL ENCOUNTER (EMERGENCY)
Facility: HOSPITAL | Age: 54
Discharge: HOME | End: 2024-03-25
Attending: EMERGENCY MEDICINE
Payer: MEDICAID

## 2024-03-25 VITALS
DIASTOLIC BLOOD PRESSURE: 86 MMHG | RESPIRATION RATE: 18 BRPM | OXYGEN SATURATION: 99 % | HEIGHT: 66 IN | BODY MASS INDEX: 28.12 KG/M2 | WEIGHT: 175 LBS | HEART RATE: 75 BPM | TEMPERATURE: 98 F | SYSTOLIC BLOOD PRESSURE: 125 MMHG

## 2024-03-25 DIAGNOSIS — R45.851 SUICIDAL IDEATION: Primary | ICD-10-CM

## 2024-03-25 LAB
ALBUMIN SERPL-MCNC: 3.7 G/DL (ref 3.5–5)
ALBUMIN/GLOB SERPL: 0.9 (ref 1.1–2.2)
ALP SERPL-CCNC: 82 U/L (ref 45–117)
ALT SERPL-CCNC: 28 U/L (ref 12–78)
ANION GAP SERPL CALC-SCNC: 4 MMOL/L (ref 5–15)
APAP SERPL-MCNC: <2 UG/ML (ref 10–30)
AST SERPL W P-5'-P-CCNC: 32 U/L (ref 15–37)
BASOPHILS # BLD: 0 K/UL (ref 0–0.1)
BASOPHILS NFR BLD: 1 % (ref 0–1)
BILIRUB SERPL-MCNC: 0.3 MG/DL (ref 0.2–1)
BUN SERPL-MCNC: 20 MG/DL (ref 6–20)
BUN/CREAT SERPL: 15 (ref 12–20)
CA-I BLD-MCNC: 9 MG/DL (ref 8.5–10.1)
CHLORIDE SERPL-SCNC: 109 MMOL/L (ref 97–108)
CO2 SERPL-SCNC: 28 MMOL/L (ref 21–32)
CREAT SERPL-MCNC: 1.3 MG/DL (ref 0.7–1.3)
DATE LAST DOSE: ABNORMAL
DATE LAST DOSE: ABNORMAL
DIFFERENTIAL METHOD BLD: NORMAL
DOSE AMOUNT: ABNORMAL UNITS
DOSE AMOUNT: ABNORMAL UNITS
EKG ATRIAL RATE: 77 BPM
EKG DIAGNOSIS: NORMAL
EKG P AXIS: 70 DEGREES
EKG P-R INTERVAL: 138 MS
EKG Q-T INTERVAL: 390 MS
EKG QRS DURATION: 86 MS
EKG QTC CALCULATION (BAZETT): 441 MS
EKG R AXIS: 73 DEGREES
EKG T AXIS: 79 DEGREES
EKG VENTRICULAR RATE: 77 BPM
EOSINOPHIL # BLD: 0.1 K/UL (ref 0–0.4)
EOSINOPHIL NFR BLD: 1 % (ref 0–7)
ERYTHROCYTE [DISTWIDTH] IN BLOOD BY AUTOMATED COUNT: 12.7 % (ref 11.5–14.5)
ETHANOL SERPL-MCNC: <10 MG/DL (ref 0–0.08)
GLOBULIN SER CALC-MCNC: 4.1 G/DL (ref 2–4)
GLUCOSE SERPL-MCNC: 90 MG/DL (ref 65–100)
HCT VFR BLD AUTO: 43.4 % (ref 36.6–50.3)
HGB BLD-MCNC: 14.1 G/DL (ref 12.1–17)
IMM GRANULOCYTES # BLD AUTO: 0 K/UL (ref 0–0.04)
IMM GRANULOCYTES NFR BLD AUTO: 0 % (ref 0–0.5)
LYMPHOCYTES # BLD: 2.1 K/UL (ref 0.8–3.5)
LYMPHOCYTES NFR BLD: 31 % (ref 12–49)
MAGNESIUM SERPL-MCNC: 2.2 MG/DL (ref 1.6–2.4)
MCH RBC QN AUTO: 29.6 PG (ref 26–34)
MCHC RBC AUTO-ENTMCNC: 32.5 G/DL (ref 30–36.5)
MCV RBC AUTO: 91 FL (ref 80–99)
MONOCYTES # BLD: 0.5 K/UL (ref 0–1)
MONOCYTES NFR BLD: 7 % (ref 5–13)
NEUTS SEG # BLD: 4.2 K/UL (ref 1.8–8)
NEUTS SEG NFR BLD: 60 % (ref 32–75)
NRBC # BLD: 0 K/UL (ref 0–0.01)
NRBC BLD-RTO: 0 PER 100 WBC
PLATELET # BLD AUTO: 284 K/UL (ref 150–400)
PMV BLD AUTO: 10.4 FL (ref 8.9–12.9)
POTASSIUM SERPL-SCNC: 4.3 MMOL/L (ref 3.5–5.1)
PROT SERPL-MCNC: 7.8 G/DL (ref 6.4–8.2)
RBC # BLD AUTO: 4.77 M/UL (ref 4.1–5.7)
SALICYLATES SERPL-MCNC: 2.6 MG/DL (ref 2.8–20)
SODIUM SERPL-SCNC: 141 MMOL/L (ref 136–145)
WBC # BLD AUTO: 6.8 K/UL (ref 4.1–11.1)

## 2024-03-25 PROCEDURE — 82077 ASSAY SPEC XCP UR&BREATH IA: CPT

## 2024-03-25 PROCEDURE — 99284 EMERGENCY DEPT VISIT MOD MDM: CPT

## 2024-03-25 PROCEDURE — 93005 ELECTROCARDIOGRAM TRACING: CPT | Performed by: EMERGENCY MEDICINE

## 2024-03-25 PROCEDURE — 85025 COMPLETE CBC W/AUTO DIFF WBC: CPT

## 2024-03-25 PROCEDURE — 80143 DRUG ASSAY ACETAMINOPHEN: CPT

## 2024-03-25 PROCEDURE — 80179 DRUG ASSAY SALICYLATE: CPT

## 2024-03-25 PROCEDURE — 36415 COLL VENOUS BLD VENIPUNCTURE: CPT

## 2024-03-25 PROCEDURE — 80053 COMPREHEN METABOLIC PANEL: CPT

## 2024-03-25 PROCEDURE — 83735 ASSAY OF MAGNESIUM: CPT

## 2024-03-25 ASSESSMENT — PAIN SCALES - GENERAL
PAINLEVEL_OUTOF10: 0
PAINLEVEL_OUTOF10: 0

## 2024-03-25 ASSESSMENT — PAIN - FUNCTIONAL ASSESSMENT: PAIN_FUNCTIONAL_ASSESSMENT: 0-10

## 2024-03-25 NOTE — BSMART NOTE
Comprehensive Assessment Form Part 1      Section I - Disposition    Primary Diagnosis:   Secondary Diagnosis:     The Medical Doctor to Psychiatrist conference was notcompleted.  The Medical Doctor is in agreement with Psychiatrist disposition because of (reason) Pt does not meet criteria for IP BH..  The plan is resources.  The on-call Psychiatrist consulted was  .  The admitting Psychiatrist will be  .  The admitting Diagnosis is .  The Payor source is unknown.      This writer reviewed the Jenkins Suicide Severity Rating Scale in nursing flowsheet and the risk level assigned is low risk.  Based on this assessment, the risk of suicide is low risk and the plan is resources    BSMART assessment completed, and suicide risk level noted to be low. Primary Nurse Ning and Physician Rhys notified. Concerns not observed.    Section II - Integrated Summary  Summary:  Pt assessed face to face in ED 22.  Pt resting on the stretcher with eyes closed, easily arousable.  Writer continued to arouse pt during assessment.  Pt states he is homeless, lost his job about 3 weeks ago, and did not have a way to  his meds at [facility].  States they are there for him to .  Pt states he used public transp to get to hosp and he can take it to get his meds.  Pt states he is SI without a plan only due to his homelessness.  Pt denies HI Hallucinations.   Pt was provided with resources for D 19 and housing.  Pt sttes that he does not understand how he can be d/c in the middle of the night.  Writer explained that it was 4pm.  Resources provided.      The patient has demonstrated mental capacity to provide informed consent.  The information is given by the patient.  The Chief Complaint is as above.  The Precipitant Factors are as above.  Previous Hospitalizations: see chart  The patient has not previously been in restraints.  Current Psychiatrist and/or  is denies, referred to D 19.    Lethality

## 2024-03-25 NOTE — ED NOTES
Pt states he is homeless and he may check in to get help bc he has no where to go, dc instructions were given and pt saw Mary

## 2024-03-25 NOTE — DISCHARGE INSTRUCTIONS
______________________________________________________________________  [facility] ([employer] [employer]    For women and children  [address]ie [place] [place], [place]  [phone]  Opens at 10am    The continuum of supportive services in housing plans may include, but are not limited to:  Referrals to job finding assistance agencies  Budgeting and money management workshops  Mental health and substance abuse screening and counseling  Domestic violence and sexual abuse counseling  Parenting and nutrition instruction  ______________________________________________________________________  [place] [facility]    Now open to residents 18 years and older.  Location: [address] [place] [address] in [place]  Will accept guests seeking cold refuge starting at 8pm throughout the winter months.   Guests must be onsite and signed in by 10pm.   Shelter closes every morning at 8am.    For more information contact Lluvia Mistry at:  [email or web address] or call [phone]  ______________________________________________________________________   [facility]    Open nightly 7pm to 7am from November to April  Located in the Ballroom at [facility]: [address] [place], [place]  Entrance is on the side of the building to the left of the main entrance.   You can arrive as early as 6pm and  line. Check-in is 7pm to 9pm. They offer warm dinner and bagged breakfast.  Contact: [phone]  ______________________________________________________________________            ADDITIONAL RESOURCES:      ______________________________________________________________________  [facility]    Call for assistance with social work  [phone]  ______________________________________________________________________

## 2024-03-25 NOTE — ED TRIAGE NOTES
Patient c/o migraines and \"messed up thoughts\".  States he is SI and is not on his usual medications.

## 2024-03-25 NOTE — ED PROVIDER NOTES
[facility] EMERGENCY DEPT  EMERGENCY DEPARTMENT HISTORY AND PHYSICAL EXAM      Date: 3/25/2024  Patient Name: Dylon Willard  MRN: [id number]  YOB: 1970  Date of evaluation: 3/25/2024  Provider: Renetta Barajas MD   Note Started: 12:17 PM EDT 3/25/24    HISTORY OF PRESENT ILLNESS     Chief Complaint   Patient presents with    Suicidal       History Provided By: Patient    HPI: Dylon Willard is a 53 y.o. male with history of HIV, cocaine abuse, meth use, MDD presenting with suicidal ideations.  Patient states he became homeless about 2 or 3 weeks ago.  He ran out of his medications around that time as well.  He has been having thoughts of hurting himself however has no plan.  Reports hearing voices telling him \"it is not worth it.\"    PAST MEDICAL HISTORY   Past Medical History:  Past Medical History:   Diagnosis Date    Abscess     Arrhythmia     Bronchitis     Cellulitis     Depression     GERD (gastroesophageal reflux disease)     HIV (human immunodeficiency virus infection) ([facility])     MRSA (methicillin resistant Staphylococcus aureus)        Past Surgical History:  Past Surgical History:   Procedure Laterality Date    CT ABSCESS DRAINAGE NECK THORAX  1/26/2022    CT ABSCESS DRAINAGE NECK THORAX 1/26/2022       Family History:  Family History   Problem Relation Age of Onset    Hypertension Mother        Social History:  Social History     Tobacco Use    Smoking status: Never    Smokeless tobacco: Never   Substance Use Topics    Alcohol use: No    Drug use: No       Allergies:  Allergies   Allergen Reactions    Levetiracetam      Flu-like symptoms: fever, headache, shakes, body aches.       PCP: Ifeanyi Arriola PA    Current Meds:   No current facility-administered medications for this encounter.     Current Outpatient Medications   Medication Sig Dispense Refill    ibuprofen (ADVIL;MOTRIN) 600 MG tablet Take 1 tablet by mouth every 8 hours as needed for Pain 30 tablet 0    hydrOXYzine HCl (ATARAX) 50 MG tablet

## 2024-03-26 LAB
EKG ATRIAL RATE: 82 BPM
EKG DIAGNOSIS: NORMAL
EKG P AXIS: 73 DEGREES
EKG P-R INTERVAL: 134 MS
EKG Q-T INTERVAL: 392 MS
EKG QRS DURATION: 84 MS
EKG QTC CALCULATION (BAZETT): 457 MS
EKG R AXIS: 74 DEGREES
EKG T AXIS: 77 DEGREES
EKG VENTRICULAR RATE: 82 BPM

## 2024-04-22 ENCOUNTER — HOSPITAL ENCOUNTER (EMERGENCY)
Facility: HOSPITAL | Age: 54
Discharge: HOME | End: 2024-04-23
Attending: EMERGENCY MEDICINE
Payer: MEDICAID

## 2024-04-22 ENCOUNTER — APPOINTMENT (OUTPATIENT)
Facility: HOSPITAL | Age: 54
End: 2024-04-22
Payer: MEDICAID

## 2024-04-22 DIAGNOSIS — R50.9 FEBRILE ILLNESS, ACUTE: Primary | ICD-10-CM

## 2024-04-22 LAB
AMPHET UR QL SCN: NEGATIVE
APPEARANCE UR: CLEAR
BACTERIA URNS QL MICRO: NEGATIVE /HPF
BARBITURATES UR QL SCN: NEGATIVE
BENZODIAZ UR QL: NEGATIVE
BILIRUB UR QL: NEGATIVE
CANNABINOIDS UR QL SCN: NEGATIVE
COCAINE UR QL SCN: POSITIVE
COLOR UR: NORMAL
FLUAV AG NPH QL IA: NEGATIVE
FLUBV AG NOSE QL IA: NEGATIVE
GLUCOSE UR STRIP.AUTO-MCNC: NEGATIVE MG/DL
HGB UR QL STRIP: NEGATIVE
KETONES UR QL STRIP.AUTO: NEGATIVE MG/DL
LEUKOCYTE ESTERASE UR QL STRIP.AUTO: NEGATIVE
Lab: ABNORMAL
METHADONE UR QL: NEGATIVE
MUCOUS THREADS URNS QL MICRO: NEGATIVE /LPF
NITRITE UR QL STRIP.AUTO: NEGATIVE
OPIATES UR QL: NEGATIVE
PCP UR QL: NEGATIVE
PH UR STRIP: 6 (ref 5–8)
PROT UR STRIP-MCNC: NEGATIVE MG/DL
RBC #/AREA URNS HPF: NORMAL /HPF (ref 0–5)
SARS-COV-2 RDRP RESP QL NAA+PROBE: NOT DETECTED
SP GR UR REFRACTOMETRY: 1.01 (ref 1–1.03)
URINE CULTURE IF INDICATED: NORMAL
UROBILINOGEN UR QL STRIP.AUTO: 0.1 EU/DL (ref 0.1–1)
WBC URNS QL MICRO: NORMAL /HPF (ref 0–4)

## 2024-04-22 PROCEDURE — 99284 EMERGENCY DEPT VISIT MOD MDM: CPT

## 2024-04-22 PROCEDURE — 87804 INFLUENZA ASSAY W/OPTIC: CPT

## 2024-04-22 PROCEDURE — 87635 SARS-COV-2 COVID-19 AMP PRB: CPT

## 2024-04-22 PROCEDURE — 80307 DRUG TEST PRSMV CHEM ANLYZR: CPT

## 2024-04-22 PROCEDURE — 6370000000 HC RX 637 (ALT 250 FOR IP): Performed by: EMERGENCY MEDICINE

## 2024-04-22 PROCEDURE — 71045 X-RAY EXAM CHEST 1 VIEW: CPT

## 2024-04-22 PROCEDURE — 81001 URINALYSIS AUTO W/SCOPE: CPT

## 2024-04-22 RX ORDER — IBUPROFEN 600 MG/1
600 TABLET ORAL
Status: COMPLETED | OUTPATIENT
Start: 2024-04-22 | End: 2024-04-22

## 2024-04-22 RX ORDER — ACETAMINOPHEN 500 MG
1000 TABLET ORAL
Status: COMPLETED | OUTPATIENT
Start: 2024-04-22 | End: 2024-04-22

## 2024-04-22 RX ADMIN — IBUPROFEN 600 MG: 600 TABLET, FILM COATED ORAL at 23:59

## 2024-04-22 RX ADMIN — ACETAMINOPHEN 1000 MG: 500 TABLET ORAL at 23:59

## 2024-04-22 ASSESSMENT — LIFESTYLE VARIABLES
HOW MANY STANDARD DRINKS CONTAINING ALCOHOL DO YOU HAVE ON A TYPICAL DAY: 1 OR 2
HOW OFTEN DO YOU HAVE A DRINK CONTAINING ALCOHOL: MONTHLY OR LESS

## 2024-04-23 VITALS
TEMPERATURE: 99.9 F | WEIGHT: 165 LBS | RESPIRATION RATE: 18 BRPM | HEART RATE: 51 BPM | SYSTOLIC BLOOD PRESSURE: 147 MMHG | HEIGHT: 66 IN | DIASTOLIC BLOOD PRESSURE: 82 MMHG | OXYGEN SATURATION: 95 % | BODY MASS INDEX: 26.52 KG/M2

## 2024-04-23 ASSESSMENT — PAIN - FUNCTIONAL ASSESSMENT: PAIN_FUNCTIONAL_ASSESSMENT: NONE - DENIES PAIN

## 2024-04-23 NOTE — ED PROVIDER NOTES
NORVASC  Take 1 tablet by mouth daily     Biktarvy -25 MG Tabs per tablet  Generic drug: bictegravir-emtricitab-tenofovir alafenamide  Take 1 tablet by mouth daily     escitalopram 20 MG tablet  Commonly known as: LEXAPRO  Take 1 tablet by mouth daily     fluticasone 220 MCG/ACT inhaler  Commonly known as: FLOVENT HFA  Inhale 2 puffs into the lungs 2 times daily as needed (SOB)     ibuprofen 600 MG tablet  Commonly known as: ADVIL;MOTRIN  Take 1 tablet by mouth every 8 hours as needed for Pain     lisinopril 10 MG tablet  Commonly known as: PRINIVIL;ZESTRIL  Take 1 tablet by mouth daily     nitroglycerin 2 % ointment  Commonly known as: NITRO-BID  Place 0.5 inches onto the skin in the morning and 0.5 inches at noon and 0.5 inches in the evening and 0.5 inches before bedtime.     omeprazole 20 MG delayed release capsule  Commonly known as: PRILOSEC  Take 1 capsule by mouth daily     traZODone 50 MG tablet  Commonly known as: DESYREL  Take 1 tablet by mouth nightly                DISCONTINUED MEDICATIONS:  Current Discharge Medication List          I am the Primary Clinician of Record. Eddie Cordova MD (electronically signed)    (Please note that parts of this dictation were completed with voice recognition software. Quite often unanticipated grammatical, syntax, homophones, and other interpretive errors are inadvertently transcribed by the computer software. Please disregards these errors. Please excuse any errors that have escaped final proofreading.)     Eddie Cordova MD  04/23/24 0044

## 2024-04-23 NOTE — ED TRIAGE NOTES
Patient complains of chills that started about 1 hour ago along with groin pain. HIV+. Admits to using crack cocaine 3 days ago, attempting to recover. Also states that there is a scratch on his left foot from itching, patient is concerned for infection. States that he has been taking benadryl.

## 2024-04-23 NOTE — DISCHARGE INSTRUCTIONS
[phone] [facility] [address]    Residential Treatment    [facility] Call Luciano at [phone] Locations Vary or [phone]    [facility] Cindy Trejo [phone] [place] [address]    [facility] Locations [place] [phone]    [facility] Locations vary [phone]    [facility] [address] [phone] [facility] [address]    [facility] [address] [phone] [facility] [address]    [facility] [address] [phone] [facility] [address]    [facility] [address] [phone] [facility] [address]    [facility] Locations [place]ary [phone]    [facility] [phone] [address] [place] [address]    [facility] [phone]    [facility] [address] [place] [address] [phone] [facility] [address]    [facility] [phone]    [facility] [address] [address]- [facility] [address] [phone]    [facility] [place] [address] Tanner Frey [phone] or [phone] [facility] [address]    [facility] [phone] For Women [phone]    Interventions    [facility] [address] [phone] [facility] [address]    Sober Living    [facility] [address] [facility] [address] [phone] [facility] [address]    [facility] [phone] or [phone]

## 2024-07-05 ENCOUNTER — HOSPITAL ENCOUNTER (EMERGENCY)
Facility: HOSPITAL | Age: 54
Discharge: HOME | End: 2024-07-05
Attending: STUDENT IN AN ORGANIZED HEALTH CARE EDUCATION/TRAINING PROGRAM
Payer: COMMERCIAL

## 2024-07-05 ENCOUNTER — APPOINTMENT (OUTPATIENT)
Facility: HOSPITAL | Age: 54
End: 2024-07-05
Payer: COMMERCIAL

## 2024-07-05 VITALS
HEART RATE: 91 BPM | WEIGHT: 160 LBS | HEIGHT: 68 IN | SYSTOLIC BLOOD PRESSURE: 100 MMHG | RESPIRATION RATE: 20 BRPM | BODY MASS INDEX: 24.25 KG/M2 | DIASTOLIC BLOOD PRESSURE: 79 MMHG | TEMPERATURE: 98.3 F | OXYGEN SATURATION: 99 %

## 2024-07-05 DIAGNOSIS — R45.851 SUICIDAL IDEATION: Primary | ICD-10-CM

## 2024-07-05 LAB
ALBUMIN SERPL-MCNC: 3 G/DL (ref 3.5–5)
ALBUMIN/GLOB SERPL: 0.7 (ref 1.1–2.2)
ALP SERPL-CCNC: 68 U/L (ref 45–117)
ALT SERPL-CCNC: 26 U/L (ref 12–78)
AMPHET UR QL SCN: NEGATIVE
ANION GAP SERPL CALC-SCNC: 6 MMOL/L (ref 5–15)
APPEARANCE UR: CLEAR
AST SERPL W P-5'-P-CCNC: 20 U/L (ref 15–37)
BACTERIA URNS QL MICRO: NEGATIVE /HPF
BARBITURATES UR QL SCN: NEGATIVE
BASOPHILS # BLD: 0 K/UL (ref 0–0.1)
BASOPHILS NFR BLD: 1 % (ref 0–1)
BENZODIAZ UR QL: NEGATIVE
BILIRUB SERPL-MCNC: 0.2 MG/DL (ref 0.2–1)
BILIRUB UR QL: NEGATIVE
BUN SERPL-MCNC: 17 MG/DL (ref 6–20)
BUN/CREAT SERPL: 11 (ref 12–20)
CA-I BLD-MCNC: 9.1 MG/DL (ref 8.5–10.1)
CANNABINOIDS UR QL SCN: NEGATIVE
CHLORIDE SERPL-SCNC: 110 MMOL/L (ref 97–108)
CO2 SERPL-SCNC: 24 MMOL/L (ref 21–32)
COCAINE UR QL SCN: POSITIVE
COLOR UR: ABNORMAL
CREAT SERPL-MCNC: 1.53 MG/DL (ref 0.7–1.3)
DIFFERENTIAL METHOD BLD: NORMAL
EKG ATRIAL RATE: 80 BPM
EKG DIAGNOSIS: NORMAL
EKG P AXIS: 79 DEGREES
EKG P-R INTERVAL: 132 MS
EKG Q-T INTERVAL: 358 MS
EKG QRS DURATION: 82 MS
EKG QTC CALCULATION (BAZETT): 412 MS
EKG R AXIS: 73 DEGREES
EKG T AXIS: 74 DEGREES
EKG VENTRICULAR RATE: 80 BPM
EOSINOPHIL # BLD: 0 K/UL (ref 0–0.4)
EOSINOPHIL NFR BLD: 1 % (ref 0–7)
EPITH CASTS URNS QL MICRO: ABNORMAL /LPF
ERYTHROCYTE [DISTWIDTH] IN BLOOD BY AUTOMATED COUNT: 12.9 % (ref 11.5–14.5)
ETHANOL SERPL-MCNC: <10 MG/DL (ref 0–0.08)
FLUAV RNA SPEC QL NAA+PROBE: NOT DETECTED
FLUBV RNA SPEC QL NAA+PROBE: NOT DETECTED
GLOBULIN SER CALC-MCNC: 4.2 G/DL (ref 2–4)
GLUCOSE SERPL-MCNC: 122 MG/DL (ref 65–100)
GLUCOSE UR STRIP.AUTO-MCNC: NEGATIVE MG/DL
HCT VFR BLD AUTO: 39.8 % (ref 36.6–50.3)
HGB BLD-MCNC: 13.2 G/DL (ref 12.1–17)
HGB UR QL STRIP: NEGATIVE
HYALINE CASTS URNS QL MICRO: ABNORMAL /LPF (ref 0–5)
IMM GRANULOCYTES # BLD AUTO: 0 K/UL (ref 0–0.04)
IMM GRANULOCYTES NFR BLD AUTO: 0 % (ref 0–0.5)
KETONES UR QL STRIP.AUTO: 15 MG/DL
LEUKOCYTE ESTERASE UR QL STRIP.AUTO: NEGATIVE
LYMPHOCYTES # BLD: 2.3 K/UL (ref 0.8–3.5)
LYMPHOCYTES NFR BLD: 46 % (ref 12–49)
Lab: ABNORMAL
MCH RBC QN AUTO: 29.3 PG (ref 26–34)
MCHC RBC AUTO-ENTMCNC: 33.2 G/DL (ref 30–36.5)
MCV RBC AUTO: 88.2 FL (ref 80–99)
METHADONE UR QL: NEGATIVE
MONOCYTES # BLD: 0.4 K/UL (ref 0–1)
MONOCYTES NFR BLD: 8 % (ref 5–13)
MUCOUS THREADS URNS QL MICRO: ABNORMAL /LPF
NEUTS SEG # BLD: 2.3 K/UL (ref 1.8–8)
NEUTS SEG NFR BLD: 44 % (ref 32–75)
NITRITE UR QL STRIP.AUTO: NEGATIVE
NRBC # BLD: 0 K/UL (ref 0–0.01)
NRBC BLD-RTO: 0 PER 100 WBC
OPIATES UR QL: NEGATIVE
PCP UR QL: NEGATIVE
PH UR STRIP: 6
PLATELET # BLD AUTO: 204 K/UL (ref 150–400)
PMV BLD AUTO: 11.7 FL (ref 8.9–12.9)
POTASSIUM SERPL-SCNC: 3.7 MMOL/L (ref 3.5–5.1)
PROT SERPL-MCNC: 7.2 G/DL (ref 6.4–8.2)
PROT UR STRIP-MCNC: NEGATIVE MG/DL
RBC # BLD AUTO: 4.51 M/UL (ref 4.1–5.7)
RBC #/AREA URNS HPF: ABNORMAL /HPF (ref 0–5)
SARS-COV-2 RNA RESP QL NAA+PROBE: NOT DETECTED
SODIUM SERPL-SCNC: 140 MMOL/L (ref 136–145)
SP GR UR REFRACTOMETRY: 1.02 (ref 1–1.03)
TROPONIN I SERPL HS-MCNC: 16 NG/L (ref 0–76)
TROPONIN I SERPL HS-MCNC: 17 NG/L (ref 0–76)
URINE CULTURE IF INDICATED: ABNORMAL
UROBILINOGEN UR QL STRIP.AUTO: 0.1 EU/DL (ref 0.2–1)
WBC # BLD AUTO: 5.1 K/UL (ref 4.1–11.1)
WBC URNS QL MICRO: ABNORMAL /HPF (ref 0–4)

## 2024-07-05 PROCEDURE — 71045 X-RAY EXAM CHEST 1 VIEW: CPT

## 2024-07-05 PROCEDURE — 99285 EMERGENCY DEPT VISIT HI MDM: CPT

## 2024-07-05 PROCEDURE — 80053 COMPREHEN METABOLIC PANEL: CPT

## 2024-07-05 PROCEDURE — 82077 ASSAY SPEC XCP UR&BREATH IA: CPT

## 2024-07-05 PROCEDURE — 87636 SARSCOV2 & INF A&B AMP PRB: CPT

## 2024-07-05 PROCEDURE — 93005 ELECTROCARDIOGRAM TRACING: CPT | Performed by: STUDENT IN AN ORGANIZED HEALTH CARE EDUCATION/TRAINING PROGRAM

## 2024-07-05 PROCEDURE — 85025 COMPLETE CBC W/AUTO DIFF WBC: CPT

## 2024-07-05 PROCEDURE — 84484 ASSAY OF TROPONIN QUANT: CPT

## 2024-07-05 PROCEDURE — 80307 DRUG TEST PRSMV CHEM ANLYZR: CPT

## 2024-07-05 PROCEDURE — 81001 URINALYSIS AUTO W/SCOPE: CPT

## 2024-07-05 ASSESSMENT — PAIN SCALES - GENERAL
PAINLEVEL_OUTOF10: 4
PAINLEVEL_OUTOF10: 0

## 2024-07-05 ASSESSMENT — PAIN DESCRIPTION - DESCRIPTORS: DESCRIPTORS: SHARP

## 2024-07-05 ASSESSMENT — HEART SCORE: ECG: NORMAL

## 2024-07-05 ASSESSMENT — PAIN DESCRIPTION - PAIN TYPE: TYPE: ACUTE PAIN

## 2024-07-05 ASSESSMENT — PAIN DESCRIPTION - LOCATION: LOCATION: CHEST

## 2024-07-05 ASSESSMENT — PAIN - FUNCTIONAL ASSESSMENT
PAIN_FUNCTIONAL_ASSESSMENT: NONE - DENIES PAIN
PAIN_FUNCTIONAL_ASSESSMENT: 0-10

## 2024-07-05 ASSESSMENT — PAIN DESCRIPTION - FREQUENCY: FREQUENCY: INTERMITTENT

## 2024-07-05 NOTE — ED PROVIDER NOTES
[facility]  EMERGENCY DEPARTMENT ENCOUNTER NOTE    Date: 7/5/2024  Patient Name: Dylon Willard    History of Presenting Illness     Chief Complaint   Patient presents with    Suicidal     SI without a plan       History obtained from: Patient    HPI: Dylon Willard, 54 y.o. male with past medical history as listed and reviewed below presents for help with substance abuse.  The patient had recurrence of cocaine use which he used yesterday.  He comes in today for help.  He reports an episode of chest pain that happened yesterday after he used cocaine.  Currently no symptoms.  Denies any SI, HI, abdominal pain, nausea, vomiting, fevers, or chills.    Medical History   I reviewed the medical, surgical, family, and social history, as well as allergies:    PCP: None, None    Past Medical History:  Past Medical History:   Diagnosis Date    Abscess     Arrhythmia     Bronchitis     Cellulitis     Depression     GERD (gastroesophageal reflux disease)     HIV (human immunodeficiency virus infection) ([facility])     MRSA (methicillin resistant Staphylococcus aureus)      Past Surgical History:  Past Surgical History:   Procedure Laterality Date    CT ABSCESS DRAINAGE NECK THORAX  1/26/2022    CT ABSCESS DRAINAGE NECK THORAX 1/26/2022     Current Outpatient Medications:  Current Outpatient Medications   Medication Instructions    albuterol sulfate HFA (PROVENTIL;VENTOLIN;PROAIR) 108 (90 Base) MCG/ACT inhaler 2 puffs, Inhalation, EVERY 6 HOURS PRN    amLODIPine (NORVASC) 5 mg, Oral, DAILY    bictegravir-emtricitab-tenofovir alafenamide (BIKTARVY) -25 MG TABS per tablet 1 tablet, Oral, DAILY    escitalopram (LEXAPRO) 20 mg, Oral, DAILY    fluticasone (FLOVENT HFA) 220 MCG/ACT inhaler 2 puffs, Inhalation, 2 TIMES DAILY PRN    ibuprofen (ADVIL;MOTRIN) 600 mg, Oral, EVERY 8 HOURS PRN    lisinopril (PRINIVIL;ZESTRIL) 10 mg, Oral, DAILY    nitroglycerin (NITRO-BID) 2 % ointment 0.5 inches, TransDERmal,

## 2024-07-05 NOTE — ED TRIAGE NOTES
Patient arrived to ED reporting SI, stating he is in a bad place financially and emotionally. Pt reports being homeless for last 12 days, reports other people have caused this and he would like to hurt them but denies being homicidal at this time.

## 2024-07-05 NOTE — BSMART NOTE
received call from Janae with [facility] who states that patient has been declined. Geovannir then contact peer , Gary, and provided patient's information. She states that she is going to reach out to a few facilities to see if there is availability for patient. She states that she will call writer back shortly.  
Eduard with [facility] currently completing intake screening with patient via phone. Patient's clinicals faxed to Eduard at [phone].   
Per Eduard at [facility]- patient able to come to facility today. Dr. Lennon notified and put patient up for discharge. Per Lonnie arias [facility]- silver/mcleod Gladis will arrive to [facility] ER entrance to pick patient up in 15 minutes. Patient's nurse, Ramon, notified.  
Pt currently completing an [facility] with [facility].  
Still awaiting UDS to send pt clinicals to rehab facility. Clinicals can be faxed to [facility] [phone].   
UA and UDS sent to [facility] as requested. Awaiting disposition from facility.   
Writer spoke with Justice at [facility] who states that he has reviewed patient's medicals and is going to present patient to nurse manager. He states that he will call writer back with disposition.   
patient is oriented to time, place, person and situation.  The patient's speech shows no evidence of impairment.  The patient's mood is euthymic.  The range of affect is flat.  The patient's thought content demonstrates no evidence of impairment .  The thought process shows no evidence of impairment.  The patient's perception shows no evidence of impairment. The patient's memory shows no evidence of impairment.  The patient's appetite shows no evidence of impairment .  The patient's sleep has evidence of insomnia. The patient shows little insight.  The patient's judgement is psychologically impaired.      Section V - Substance Abuse  The patient is  using substances.  The patient is using cocaine  smoked for greater than 10 years with last use on 7/4/2024. The patient has experienced the following withdrawal symptoms: N/A.    Section VI - Living Arrangements  The patient Single.  The patient lives with a significant other. The patient has no children.  The patient does not plan to return home upon discharge.  The patient does not have legal issues pending. The patient's source of income comes from employment.  Mandaeism and cultural practices have not been voiced at this time.    The patient's greatest support comes from his significant other and this person will not be involved with the treatment.    The patient has not been in an event described as horrible or outside the realm of ordinary life experience either currently or in the past.  The patient has not been a victim of sexual/physical abuse.    Section VII - Other Areas of Clinical Concern  The highest grade achieved is 12th grade with the overall quality of school experience being described as not assessed.  The patient is currently self-employed and speaks English as a primary language.  The patient has no communication impairments affecting communication. The patient's preference for learning can be described as: can read and write adequately.  The